# Patient Record
Sex: FEMALE | Race: WHITE | Employment: UNEMPLOYED | RURAL
[De-identification: names, ages, dates, MRNs, and addresses within clinical notes are randomized per-mention and may not be internally consistent; named-entity substitution may affect disease eponyms.]

---

## 2021-03-23 ENCOUNTER — HOSPITAL ENCOUNTER (OUTPATIENT)
Dept: LAB | Age: 56
Discharge: HOME | End: 2021-03-23

## 2021-03-23 LAB
ALBUMIN SERPL-MCNC: 2.9 G/DL (ref 3.5–5)
ALBUMIN/GLOB SERPL: 0.6 {RATIO} (ref 1.1–2.2)
ALP SERPL-CCNC: 219 U/L (ref 45–117)
ALT SERPL-CCNC: 28 U/L (ref 12–78)
ANION GAP SERPL CALC-SCNC: 6 MMOL/L (ref 5–15)
APTT PPP: 26.9 SEC (ref 22.1–31)
AST SERPL-CCNC: 99 U/L (ref 15–37)
BASOPHILS # BLD: 0.1 K/UL (ref 0–0.1)
BASOPHILS NFR BLD: 1 % (ref 0–1)
BILIRUB SERPL-MCNC: 1.1 MG/DL (ref 0.2–1)
BUN SERPL-MCNC: 4 MG/DL (ref 6–20)
BUN/CREAT SERPL: 7 (ref 12–20)
CALCIUM SERPL-MCNC: 8.2 MG/DL (ref 8.5–10.1)
CHLORIDE SERPL-SCNC: 103 MMOL/L (ref 97–108)
CO2 SERPL-SCNC: 32 MMOL/L (ref 21–32)
CREAT SERPL-MCNC: 0.58 MG/DL (ref 0.55–1.02)
DIFFERENTIAL METHOD BLD: ABNORMAL
EOSINOPHIL # BLD: 0.1 K/UL (ref 0–0.4)
EOSINOPHIL NFR BLD: 1 % (ref 0–7)
ERYTHROCYTE [DISTWIDTH] IN BLOOD BY AUTOMATED COUNT: 14.2 % (ref 11.5–14.5)
GLOBULIN SER CALC-MCNC: 4.9 G/DL (ref 2–4)
GLUCOSE SERPL-MCNC: 93 MG/DL (ref 65–100)
HCT VFR BLD AUTO: 36.3 % (ref 35–47)
HGB BLD-MCNC: 11.9 G/DL (ref 11.5–16)
IMM GRANULOCYTES # BLD AUTO: 0 K/UL (ref 0–0.04)
IMM GRANULOCYTES NFR BLD AUTO: 0 % (ref 0–0.5)
INR PPP: 1.3 (ref 0.9–1.1)
LYMPHOCYTES # BLD: 1.3 K/UL (ref 0.8–3.5)
LYMPHOCYTES NFR BLD: 17 % (ref 12–49)
MCH RBC QN AUTO: 37.9 PG (ref 26–34)
MCHC RBC AUTO-ENTMCNC: 32.8 G/DL (ref 30–36.5)
MCV RBC AUTO: 115.6 FL (ref 80–99)
MONOCYTES # BLD: 0.5 K/UL (ref 0–1)
MONOCYTES NFR BLD: 7 % (ref 5–13)
NEUTS SEG # BLD: 5.4 K/UL (ref 1.8–8)
NEUTS SEG NFR BLD: 74 % (ref 32–75)
NRBC # BLD: 0 K/UL (ref 0–0.01)
NRBC BLD-RTO: 0 PER 100 WBC
PLATELET # BLD AUTO: 125 K/UL (ref 150–400)
PMV BLD AUTO: 10.1 FL (ref 8.9–12.9)
POTASSIUM SERPL-SCNC: 3.8 MMOL/L (ref 3.5–5.1)
PROT SERPL-MCNC: 7.8 G/DL (ref 6.4–8.2)
PROTHROMBIN TIME: 12.7 SEC (ref 9–11.1)
RBC # BLD AUTO: 3.14 M/UL (ref 3.8–5.2)
RBC MORPH BLD: ABNORMAL
RBC MORPH BLD: ABNORMAL
SODIUM SERPL-SCNC: 141 MMOL/L (ref 136–145)
THERAPEUTIC RANGE,PTTT: NORMAL SECS (ref 58–77)
WBC # BLD AUTO: 7.4 K/UL (ref 3.6–11)

## 2021-03-23 PROCEDURE — 85025 COMPLETE CBC W/AUTO DIFF WBC: CPT

## 2021-03-23 PROCEDURE — 85730 THROMBOPLASTIN TIME PARTIAL: CPT

## 2021-03-23 PROCEDURE — 80053 COMPREHEN METABOLIC PANEL: CPT

## 2021-03-23 PROCEDURE — 85610 PROTHROMBIN TIME: CPT

## 2021-04-22 ENCOUNTER — HOSPITAL ENCOUNTER (OUTPATIENT)
Dept: LAB | Age: 56
Discharge: HOME | End: 2021-04-22

## 2021-04-22 LAB
ALBUMIN SERPL-MCNC: 3 G/DL (ref 3.5–5)
ALBUMIN/GLOB SERPL: 0.6 {RATIO} (ref 1.1–2.2)
ALP SERPL-CCNC: 224 U/L (ref 45–117)
ALT SERPL-CCNC: 38 U/L (ref 12–78)
ANION GAP SERPL CALC-SCNC: 10 MMOL/L (ref 5–15)
AST SERPL-CCNC: 162 U/L (ref 15–37)
BASOPHILS # BLD: 0.1 K/UL (ref 0–0.1)
BASOPHILS NFR BLD: 1 % (ref 0–1)
BILIRUB SERPL-MCNC: 0.9 MG/DL (ref 0.2–1)
BUN SERPL-MCNC: 4 MG/DL (ref 6–20)
BUN/CREAT SERPL: 7 (ref 12–20)
CALCIUM SERPL-MCNC: 8.1 MG/DL (ref 8.5–10.1)
CHLORIDE SERPL-SCNC: 100 MMOL/L (ref 97–108)
CO2 SERPL-SCNC: 30 MMOL/L (ref 21–32)
CREAT SERPL-MCNC: 0.56 MG/DL (ref 0.55–1.02)
DIFFERENTIAL METHOD BLD: ABNORMAL
EOSINOPHIL # BLD: 0 K/UL (ref 0–0.4)
EOSINOPHIL NFR BLD: 1 % (ref 0–7)
ERYTHROCYTE [DISTWIDTH] IN BLOOD BY AUTOMATED COUNT: 13.6 % (ref 11.5–14.5)
GLOBULIN SER CALC-MCNC: 5.3 G/DL (ref 2–4)
GLUCOSE SERPL-MCNC: 94 MG/DL (ref 65–100)
HCT VFR BLD AUTO: 36.2 % (ref 35–47)
HGB BLD-MCNC: 12.3 G/DL (ref 11.5–16)
IMM GRANULOCYTES # BLD AUTO: 0 K/UL (ref 0–0.04)
IMM GRANULOCYTES NFR BLD AUTO: 0 % (ref 0–0.5)
INR PPP: 1.3 (ref 0.9–1.1)
LYMPHOCYTES # BLD: 1.9 K/UL (ref 0.8–3.5)
LYMPHOCYTES NFR BLD: 24 % (ref 12–49)
MCH RBC QN AUTO: 34.9 PG (ref 26–34)
MCHC RBC AUTO-ENTMCNC: 34 G/DL (ref 30–36.5)
MCV RBC AUTO: 102.8 FL (ref 80–99)
MONOCYTES # BLD: 0.6 K/UL (ref 0–1)
MONOCYTES NFR BLD: 7 % (ref 5–13)
NEUTS SEG # BLD: 5.1 K/UL (ref 1.8–8)
NEUTS SEG NFR BLD: 67 % (ref 32–75)
NRBC # BLD: 0 K/UL (ref 0–0.01)
NRBC BLD-RTO: 0 PER 100 WBC
PLATELET # BLD AUTO: 86 K/UL (ref 150–400)
PMV BLD AUTO: 10.6 FL (ref 8.9–12.9)
POTASSIUM SERPL-SCNC: 3.7 MMOL/L (ref 3.5–5.1)
PROT SERPL-MCNC: 8.3 G/DL (ref 6.4–8.2)
PROTHROMBIN TIME: 13 SEC (ref 9–11.1)
RBC # BLD AUTO: 3.52 M/UL (ref 3.8–5.2)
SODIUM SERPL-SCNC: 140 MMOL/L (ref 136–145)
WBC # BLD AUTO: 7.7 K/UL (ref 3.6–11)

## 2021-04-22 PROCEDURE — 85025 COMPLETE CBC W/AUTO DIFF WBC: CPT

## 2021-04-22 PROCEDURE — 80053 COMPREHEN METABOLIC PANEL: CPT

## 2021-04-22 PROCEDURE — 85610 PROTHROMBIN TIME: CPT

## 2021-04-28 ENCOUNTER — OFFICE VISIT (OUTPATIENT)
Dept: SURGERY | Age: 56
End: 2021-04-28
Payer: MEDICAID

## 2021-04-28 VITALS
TEMPERATURE: 97.3 F | SYSTOLIC BLOOD PRESSURE: 133 MMHG | RESPIRATION RATE: 16 BRPM | WEIGHT: 101 LBS | BODY MASS INDEX: 19.83 KG/M2 | OXYGEN SATURATION: 97 % | DIASTOLIC BLOOD PRESSURE: 84 MMHG | HEART RATE: 98 BPM | HEIGHT: 60 IN

## 2021-04-28 DIAGNOSIS — K70.31 ALCOHOLIC CIRRHOSIS OF LIVER WITH ASCITES (HCC): ICD-10-CM

## 2021-04-28 DIAGNOSIS — F10.10 ETOH ABUSE: ICD-10-CM

## 2021-04-28 DIAGNOSIS — K43.9 VENTRAL HERNIA WITHOUT OBSTRUCTION OR GANGRENE: Primary | ICD-10-CM

## 2021-04-28 PROCEDURE — 99205 OFFICE O/P NEW HI 60 MIN: CPT | Performed by: SURGERY

## 2021-04-28 RX ORDER — FLUOXETINE HYDROCHLORIDE 20 MG/1
20 CAPSULE ORAL DAILY
COMMUNITY
End: 2021-10-28

## 2021-04-28 RX ORDER — METOPROLOL TARTRATE 25 MG/1
25 TABLET, FILM COATED ORAL 2 TIMES DAILY
Status: ON HOLD | COMMUNITY
End: 2021-06-07

## 2021-04-28 RX ORDER — SPIRONOLACTONE 25 MG/1
25 TABLET ORAL AS NEEDED
COMMUNITY
End: 2021-09-30 | Stop reason: SINTOL

## 2021-04-28 NOTE — PROGRESS NOTES
HISTORY OF PRESENT ILLNESS  Lili Garcia is a 60 y.o. female who comes in for consultation by Santiago Kidd NP for a hernia  HPI  She has had a hernia near the umbilicus for several years. Recently the area has become tender and bothersome. She denies associated nausea, vomiting, diarrhea, constipation, melena, hematochezia, dysuria or hematuria. She also was recently dx with cirrhosis and US suggested cirrhotic liver morphology and a small amount of ascites. She drinks at least 6 beers daily and has done so for many years. Past Medical History:   Diagnosis Date    Asthma     Weight loss      Past Surgical History:   Procedure Laterality Date    HX OTHER SURGICAL  1988    bunon removal    IN BREAST SURGERY PROCEDURE UNLISTED  1983    breast reduction      History reviewed. No pertinent family history. Social History     Tobacco Use    Smoking status: Never Smoker    Smokeless tobacco: Current User     Types: Chew   Substance Use Topics    Alcohol use: Yes     Comment: socially    Drug use: Never     Current Outpatient Medications   Medication Sig    FLUoxetine (PROzac) 20 mg capsule Take 20 mg by mouth daily.  b complex-vitamin c-folic acid (NEPHROCAPS) 1 mg capsule Take 1 Cap by mouth daily.  metoprolol tartrate (LOPRESSOR) 25 mg tablet Take 25 mg by mouth two (2) times a day.  spironolactone (ALDACTONE) 25 mg tablet Take 25 mg by mouth daily.  COVID-19 VACC,MRNA,MODERNA,-PF IM by IntraMUSCular route. Indications: First dose 3/7/21 Second dose 4/4/21     No current facility-administered medications for this visit. No Known Allergies    Review of Systems   Constitutional: Positive for weight loss. Negative for chills, diaphoresis and fever. HENT: Negative for sore throat. Eyes: Negative for blurred vision and discharge. Respiratory: Positive for shortness of breath and wheezing. Negative for cough.     Cardiovascular: Negative for chest pain, palpitations, orthopnea, claudication and leg swelling. Gastrointestinal: Negative for abdominal pain, constipation, diarrhea, heartburn, melena, nausea and vomiting. Genitourinary: Negative for dysuria, flank pain, frequency and hematuria. Musculoskeletal: Negative for back pain, joint pain, myalgias and neck pain. Skin: Negative for rash. Neurological: Negative for dizziness, speech change, focal weakness, seizures, loss of consciousness, weakness and headaches. Hx CVA   Endo/Heme/Allergies: Bruises/bleeds easily. Psychiatric/Behavioral: Negative for depression and memory loss. Visit Vitals  /84 (BP 1 Location: Left upper arm, BP Patient Position: Sitting, BP Cuff Size: Adult)   Pulse 98   Temp 97.3 °F (36.3 °C) (Temporal)   Resp 16   Ht 5' (1.524 m)   Wt 45.8 kg (101 lb)   SpO2 97%   BMI 19.73 kg/m²       Physical Exam  Constitutional:       General: She is not in acute distress. Appearance: She is well-developed. She is not diaphoretic. HENT:      Head: Normocephalic and atraumatic. Mouth/Throat:      Pharynx: No oropharyngeal exudate. Eyes:      General: No scleral icterus. Conjunctiva/sclera: Conjunctivae normal.      Pupils: Pupils are equal, round, and reactive to light. Neck:      Musculoskeletal: Normal range of motion and neck supple. Thyroid: No thyromegaly. Vascular: No JVD. Trachea: No tracheal deviation. Cardiovascular:      Rate and Rhythm: Normal rate and regular rhythm. Heart sounds: No murmur. No friction rub. No gallop. Pulmonary:      Effort: Pulmonary effort is normal. No respiratory distress. Breath sounds: Normal breath sounds. No wheezing or rales. Abdominal:      General: Bowel sounds are normal. There is no distension. Palpations: Abdomen is soft. There is hepatomegaly (6 cm below costal margin). There is no mass. Tenderness: There is abdominal tenderness in the periumbilical area. There is no guarding or rebound. Hernia: A hernia is present. Hernia is present in the ventral area (tender but reducible hernia near umbilicus). Musculoskeletal: Normal range of motion. Lymphadenopathy:      Cervical: No cervical adenopathy. Skin:     General: Skin is warm and dry. Coloration: Skin is not pale. Findings: No erythema or rash. Comments: Multiple sites of ecchymosis on extremities   Neurological:      Mental Status: She is alert and oriented to person, place, and time. Cranial Nerves: No cranial nerve deficit. Psychiatric:         Behavior: Behavior normal.         Thought Content: Thought content normal.         Judgment: Judgment normal.         ASSESSMENT and PLAN  1. Ventral hernia near umbilicus. Symptomatic but reducible. I explained about the anatomy and pathophysiology of hernias and the risk of incarceration and strangulation of the bowel. I explained about hernia repairs (open with and without mesh, and robotic assisted and laparoscopic with mesh). I explained the risks and benefits of repair including bleeding, infection, chronic pain, bowel or bladder injury, hernia recurrence, seroma, mesh infection requiring removal.  I explained it would be a six to eight week recuperation with no driving for 5 - 7 days, no lifting for six weeks. 2.   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 She has appointment with Dr Aaron Curtis in early May for assessment  While I do not have all her labs, those written in the notes suggest a Margie-Suarez class C cirrhotic  3. ETOH abuse  I recommended cessation. She has not gone through withdrawal but is at high risk and I cautioned her  4. Anemia Hgb 9.4. Likely some GI losses and nutrition related  5. Thrombocytopenia. Platelets 631 K    At this point she needs further work up with Dr Aaron Curtis as planned.   She is at high risk for elective surgical intervention    She will return after work up by Dr Jonathan Caro MD FACS

## 2021-04-28 NOTE — PROGRESS NOTES
Identified pt with two pt identifiers(name and ). Reviewed record in preparation for visit and have obtained necessary documentation. All patient medications has been reviewed. Chief Complaint   Patient presents with    Possible Hernia     Seen at the request of Nabila Javier N.P for eval of umb hernia       Health Maintenance Due   Topic    COVID-19 Vaccine (1)    DTaP/Tdap/Td series (1 - Tdap)    PAP AKA CERVICAL CYTOLOGY     Shingrix Vaccine Age 49> (1 of 2)    Colorectal Cancer Screening Combo     Breast Cancer Screen Mammogram        Vitals:    21 1519   BP: 133/84   Pulse: 98   Resp: 16   Temp: 97.3 °F (36.3 °C)   TempSrc: Temporal   SpO2: 97%   Weight: 45.8 kg (101 lb)   Height: 5' (1.524 m)   PainSc:   8   PainLoc: Abdomen       4. Have you been to the ER, urgent care clinic since your last visit? Hospitalized since your last visit? No    5. Have you seen or consulted any other health care providers outside of the [address] [place] [address] since your last visit? Include any pap smears or colon screening. No      Patient is accompanied by self I have received verbal consent from Gila Trejo to discuss any/all medical information while they are present in the room.

## 2021-05-05 ENCOUNTER — HOSPITAL ENCOUNTER (OUTPATIENT)
Dept: LAB | Age: 56
Discharge: HOME | End: 2021-05-05

## 2021-05-05 ENCOUNTER — OFFICE VISIT (OUTPATIENT)
Dept: HEMATOLOGY | Age: 56
End: 2021-05-05
Payer: MEDICAID

## 2021-05-05 VITALS
BODY MASS INDEX: 18.95 KG/M2 | DIASTOLIC BLOOD PRESSURE: 72 MMHG | HEIGHT: 61 IN | WEIGHT: 100.38 LBS | SYSTOLIC BLOOD PRESSURE: 122 MMHG | TEMPERATURE: 97 F | HEART RATE: 73 BPM | OXYGEN SATURATION: 98 %

## 2021-05-05 DIAGNOSIS — K70.31 ALCOHOLIC CIRRHOSIS OF LIVER WITH ASCITES (HCC): Primary | ICD-10-CM

## 2021-05-05 LAB — XX-LABCORP SPECIMEN COL,LCBCF: NORMAL

## 2021-05-05 PROCEDURE — 99204 OFFICE O/P NEW MOD 45 MIN: CPT | Performed by: INTERNAL MEDICINE

## 2021-05-05 PROCEDURE — 99001 SPECIMEN HANDLING PT-LAB: CPT

## 2021-05-05 NOTE — PROGRESS NOTES
Lorie Pour [address]      Татьяна Peterson MD, Christel Euceda, Barber Breen MD, MPH      Roberto Alonso, PA-C Vito Landau, [facility]-BC     Bhavana Delcid, [facility]   Cesar Angela, ELIEL-TITA Marcano, [facility]       Chicho [facility] 136    at [address], [address], [facility] 22.    [phone]    FAX: [address] [place] [address], [address]    [phone]    FAX: [phone]       Patient Care Team:  Dusty Martinez NP as PCP - General (Nurse Practitioner)      Problem List  Date Reviewed: 5/5/2021          Codes Class Noted    Ventral hernia without obstruction or gangrene ICD-10-CM: K43.9  ICD-9-CM: 553.20  0/82/1080        Alcoholic cirrhosis of liver with ascites [facility]) ICD-10-CM: K70.31  ICD-9-CM: 571.2  4/28/2021        ETOH abuse ICD-10-CM: F10.10  ICD-9-CM: 305.00  4/28/2021                The clinicians listed above have asked me to see Carson Mcnair in consultation regarding management of cirrhosis secondary to alcohol. All medical records sent by the referring physicians were reviewed including imaging studies     The patient is a 60 y.o.  female who was found to have chronic liver disease and cirrhosis in 3/2021 when she underwent a liver ultrasound. She says she had a LBX but does not remember when or where. I cannot find a record of a liver biopsy in the [facility] or [facility]. Serologic evaluation for markers of chronic liver disease has either not been performed or the results are not available. The patient has developed the following complications of cirrhosis: ascites,     The patient does not have any symptoms which could be attributed to the liver disorder.     The patient is not experiencing the following symptoms which are commonly seen in this liver disorder:   fatigue,   pain in the right side over the liver,     The patient completes all daily activities without any functional limitations. ASSESSMENT AND PLAN:  Cirrhosis  The diagnosis of cirrhosis is based upon imaging, laboratory studies, complications of cirrhosis. Cirrhosis is secondary to alcohol. The patient has near normal liver function. The CTP is 6. Child class A. The MELD score is 11. Have performed laboratory testing to monitor liver function and degree of liver injury. This included BMP, hepatic panel, CBC with platelet count, INR. AST is elevated. ALT is normal.  ALP is elevated. Liver function is normal.  Total bilirubin is   elevated. The platelet count is depressed. Alcohol liver disease  Suspect the patient has alcohol induced liver disease based upon a history of consuming significant alcohol on a daily basis for many years, pattern of AST>ALT, serology that is negative for other causes of chronic liver disease. The patient has consumed 9-98 alcoholic beverages daily. The patient has been abstinent from alcohol since 4/2021. Discussed the need to remain alcohol free long term. Ascites   Ascites developed for the first time in 3/2021. Ascites has resolved with current dose of diuretics and abstinence  Will continue the current dose of diuretics with aldactone 35 mg every day. The patient was counseled regarding the need to maintain sodium restriction and the types of foods containing high amounts of sodium to be avoided. Lower extremity edema  Edema has resolved with current dose of diuretics. The renal function is normal and edema should be able to be controled with diuretics. Screening for Esophageal varices   The patient has not had an EGD to screen for varices. Will schedule for EGD to assess for varices     Hepatic encephalopathy   Overt HE has not developed to date. There is no reason for treatment with lactulose or xifaxan. There is no need to restrict dietary protein at this time. Thrombocytopenia   This is secondary to cirrhosis. There is no evidence of overt bleeding. No treatment is required. The platelet count is adequate for the patient to undergo procedures without the need for platelet transfusion or platelet growth factors. Screening for Hepatocellular Carcinoma  HCC screening has recently been performed and does not suggest [address] The next liver imaging study will be performed in 9/2021. AFP was ordered today and was normal    Treatment of other medical problems in patients with chronic liver disease  There are no contraindications for the patient to take most medications that are necessary for treatment of other medical issues. Counseling for alcohol in patients with chronic liver disease  The patient was counseled regarding alcohol consumption and the effect of alcohol on chronic liver disease. The patient has not consumed alcohol since 4/2021    Osteoporosis  The risk of osteoporosis is increased in patients with cirrhosis. DEXA bone density to assess for osteoporosis has not been performed. This should be ordered by the patients primary care physician. Vaccinations   Vaccination for viral hepatitis A and B is not needed. The patient has serologic evidence of prior exposure or vaccination with immunity. Routine vaccinations against other bacterial and viral agents can be performed as indicated. Annual flu vaccination should be administered if indicated. ALLERGIES  No Known Allergies    MEDICATIONS  Current Outpatient Medications   Medication Sig    FLUoxetine (PROzac) 20 mg capsule Take 20 mg by mouth daily.  b complex-vitamin c-folic acid (NEPHROCAPS) 1 mg capsule Take 1 Cap by mouth daily.  spironolactone (ALDACTONE) 25 mg tablet Take 25 mg by mouth daily.     metoprolol tartrate (LOPRESSOR) 25 mg tablet Take 25 mg by mouth two (2) times a day.  COVID-19 VACC,MRNA,MODERNA,-PF IM by IntraMUSCular route. Indications: First dose 3/7/21 Second dose 21     No current facility-administered medications for this visit. SYSTEM REVIEW NOT RELATED TO LIVER DISEASE OR REVIEWED ABOVE:  Constitution systems: Negative for fever, chills, weight gain, weight loss. Eyes: Negative for visual changes. ENT: Negative for sore throat, painful swallowing. Respiratory: Negative for cough, hemoptysis, SOB. Cardiology: Negative for chest pain, palpitations. GI:  Negative for constipation or diarrhea. : Negative for urinary frequency, dysuria, hematuria, nocturia. Skin: Negative for rash. Hematology: Negative for easy bruising, blood clots. Musculo-skelatal: Negative for back pain, muscle pain, weakness. Neurologic: Negative for headaches, dizziness, vertigo, memory problems not related to HE. Psychology: Negative for anxiety, depression. FAMILY HISTORY:  The father  of MI. The mother  of DM. There is no family history of liver disease. SOCIAL HISTORY:  The patient is . The patient has 1 child. The patient has never used tobacco products. The patient consumes 6-75 alcoholic beverages per day   The patient has been abstinent from alcohol since 2020. The patient used to work as a . The patient has not worked since 2021. PHYSICAL EXAMINATION:  Visit Vitals  /72   Pulse 73   Temp 97 °F (36.1 °C) (Tympanic)   Ht 5' 1\" (1.549 m)   Wt 100 lb 6 oz (45.5 kg)   SpO2 98%   BMI 18.97 kg/m²     General: No acute distress. Eyes: Sclera anicteric. ENT: No oral lesions. Thyroid normal.  Nodes: No adenopathy. Skin: No spider angiomata. No jaundice. No palmar erythema. Respiratory: Lungs clear to auscultation. Cardiovascular: Regular heart rate. No murmurs. No JVD. Abdomen: Soft non-tender. Liver size normal to percussion/palpation.   Spleen not palpable. No obvious ascites. Umbilical hernia  Extremities: No edema. No muscle wasting. No gross arthritic changes. Neurologic: Alert and oriented. Cranial nerves grossly intact. No asterixis. LABORATORY STUDIES:  Liver [place] of [address] & Units 5/5/2021 4/22/2021   WBC 3.4 - 10.8 x10E3/uL 5.6 7.7   ANC 1.4 - 7.0 x10E3/uL 4.1 5.1   HGB 11.1 - 15.9 g/dL 11.9 12.3    - 450 x10E3/uL 112 (L) 86 (L)   INR 0.9 - 1.2 1.2 1.3 (H)   AST 0 - 40 IU/L 99 (H) 162 (H)   ALT 0 - 32 IU/L 25 38   Alk Phos 39 - 117 IU/L 195 (H) 224 (H)   Bili, Total 0.0 - 1.2 mg/dL 2.1 (H) 0.9   Bili, Direct 0.00 - 0.40 mg/dL 1.05 (H)    Albumin 3.8 - 4.9 g/dL 4.1 3.0 (L)   BUN 6 - 24 mg/dL 5 (L) 4 (L)   Creat 0.57 - 1.00 mg/dL 0.57 0.56   Na 134 - 144 mmol/L 133 (L) 140   K 3.5 - 5.2 mmol/L 3.6 3.7   Cl 96 - 106 mmol/L 98 100   CO2 20 - 29 mmol/L 22 30   Glucose 65 - 99 mg/dL 93 94     Cancer Screening Latest Ref Rng & Units 5/5/2021   AFP, Serum 0.0 - 8.0 ng/mL 12.1 (H)   AFP-L3% 0.0 - 9.9 % Comment     SEROLOGIES:  Serologies Latest Ref Rng & Units 5/5/2021   Hep A Ab, Total Negative Positive (A)   Hep B Surface Ag Negative Negative   Hep B Surface Ag Index    Hep B Surface Ag Interp NEG      Hep B Core Ab, Total Negative Negative   Hep B Surface AB QL  Reactive   Hep C Ab 0.0 - 0.9 s/co ratio <0.1   Hep C Ab NR      Ferritin 15 - 150 ng/mL 128   Iron % Saturation 15 - 55 % 31   MULUGETA, IFA  Negative   ASMCA 0 - 19 Units 9   Ceruloplasmin 19.0 - 39.0 mg/dL 25.5   Alpha-1 antitrypsin level 101 - 187 mg/dL 198 (H)     LIVER HISTOLOGY:  Not available or performed    ENDOSCOPIC PROCEDURES:  Not available or performed    RADIOLOGY:  3/2021. Ultrasound of liver. Echogenic consistent with cirrhosis. No liver mass lesions. No dilated bile ducts. Mild ascites. OTHER TESTING:  Not available or performed    FOLLOW-UP:  All of the issues listed above in the Assessment and Plan were discussed with the patient.   All questions were answered. The patient expressed a clear understanding of the above. [address] [address] in 4 weeks to review all data and determine the treatment plan.       Topher Dove MD  [address] [address]  4 [facility], [address] [place] [address], [address] [place] [address] Street [address]  [address] [place] [address]

## 2021-05-05 NOTE — Clinical Note
5/14/2021 Patient: Madeline Kat YOB: 1965 Date of Visit: 5/5/2021 David Hernandez NP 
[address] [place] [id number] [address] Fax: [phone] Dear David Hernandez NP, Thank you for referring Ms. Alley Morel to [address] for evaluation. My notes for this consultation are attached. If you have questions, please do not hesitate to call me. I look forward to following your patient along with you. Sincerely, Lynda George MD

## 2021-05-07 LAB
A1AT SERPL-MCNC: 198 MG/DL (ref 101–187)
ACTIN IGG SERPL-ACNC: 9 UNITS (ref 0–19)
AFP L3 MFR SERPL: ABNORMAL % (ref 0–9.9)
AFP SERPL-MCNC: 12.1 NG/ML (ref 0–8)
ALBUMIN SERPL-MCNC: 4.1 G/DL (ref 3.8–4.9)
ALP SERPL-CCNC: 195 IU/L (ref 39–117)
ALT SERPL-CCNC: 25 IU/L (ref 0–32)
ANA TITR SER IF: NEGATIVE {TITER}
AST SERPL-CCNC: 99 IU/L (ref 0–40)
BASOPHILS # BLD AUTO: 0.1 X10E3/UL (ref 0–0.2)
BASOPHILS NFR BLD AUTO: 1 %
BILIRUB DIRECT SERPL-MCNC: 1.05 MG/DL (ref 0–0.4)
BILIRUB SERPL-MCNC: 2.1 MG/DL (ref 0–1.2)
BUN SERPL-MCNC: 5 MG/DL (ref 6–24)
BUN/CREAT SERPL: 9 (ref 9–23)
CALCIUM SERPL-MCNC: 9.2 MG/DL (ref 8.7–10.2)
CERULOPLASMIN SERPL-MCNC: 25.5 MG/DL (ref 19–39)
CHLORIDE SERPL-SCNC: 98 MMOL/L (ref 96–106)
CO2 SERPL-SCNC: 22 MMOL/L (ref 20–29)
CREAT SERPL-MCNC: 0.57 MG/DL (ref 0.57–1)
EOSINOPHIL # BLD AUTO: 0.1 X10E3/UL (ref 0–0.4)
EOSINOPHIL NFR BLD AUTO: 1 %
ERYTHROCYTE [DISTWIDTH] IN BLOOD BY AUTOMATED COUNT: 13.3 % (ref 11.7–15.4)
FERRITIN SERPL-MCNC: 128 NG/ML (ref 15–150)
GLUCOSE SERPL-MCNC: 93 MG/DL (ref 65–99)
HAV AB SER QL IA: POSITIVE
HBV CORE AB SERPL QL IA: NEGATIVE
HBV SURFACE AB SER QL: REACTIVE
HBV SURFACE AG SERPL QL IA: NEGATIVE
HCT VFR BLD AUTO: 33.8 % (ref 34–46.6)
HCV AB S/CO SERPL IA: <0.1 S/CO RATIO (ref 0–0.9)
HCV AB SERPL QL IA: NORMAL
HGB BLD-MCNC: 11.9 G/DL (ref 11.1–15.9)
IMM GRANULOCYTES # BLD AUTO: 0 X10E3/UL (ref 0–0.1)
IMM GRANULOCYTES NFR BLD AUTO: 0 %
INR PPP: 1.2 (ref 0.9–1.2)
IRON SATN MFR SERPL: 31 % (ref 15–55)
IRON SERPL-MCNC: 118 UG/DL (ref 27–159)
LYMPHOCYTES # BLD AUTO: 0.8 X10E3/UL (ref 0.7–3.1)
LYMPHOCYTES NFR BLD AUTO: 14 %
MCH RBC QN AUTO: 35.8 PG (ref 26.6–33)
MCHC RBC AUTO-ENTMCNC: 35.2 G/DL (ref 31.5–35.7)
MCV RBC AUTO: 102 FL (ref 79–97)
MONOCYTES # BLD AUTO: 0.6 X10E3/UL (ref 0.1–0.9)
MONOCYTES NFR BLD AUTO: 11 %
NEUTROPHILS # BLD AUTO: 4.1 X10E3/UL (ref 1.4–7)
NEUTROPHILS NFR BLD AUTO: 73 %
PLATELET # BLD AUTO: 112 X10E3/UL (ref 150–450)
POTASSIUM SERPL-SCNC: 3.6 MMOL/L (ref 3.5–5.2)
PROT SERPL-MCNC: 8.4 G/DL (ref 6–8.5)
PROTHROMBIN TIME: 13 SEC (ref 9.1–12)
RBC # BLD AUTO: 3.32 X10E6/UL (ref 3.77–5.28)
SODIUM SERPL-SCNC: 133 MMOL/L (ref 134–144)
TIBC SERPL-MCNC: 376 UG/DL (ref 250–450)
UIBC SERPL-MCNC: 258 UG/DL (ref 131–425)
WBC # BLD AUTO: 5.6 X10E3/UL (ref 3.4–10.8)

## 2021-05-14 PROBLEM — R18.8 ASCITES: Status: ACTIVE | Noted: 2021-05-14

## 2021-05-14 PROBLEM — K70.9 ALCOHOLIC LIVER DISEASE (HCC): Status: ACTIVE | Noted: 2021-05-14

## 2021-05-14 PROBLEM — F10.11 ALCOHOL ABUSE, IN REMISSION: Status: ACTIVE | Noted: 2021-04-28

## 2021-05-14 PROBLEM — K74.60 CIRRHOSIS (HCC): Status: ACTIVE | Noted: 2021-04-28

## 2021-06-01 ENCOUNTER — DOCUMENTATION ONLY (OUTPATIENT)
Dept: HEMATOLOGY | Age: 56
End: 2021-06-01

## 2021-06-01 NOTE — PROGRESS NOTES
Spoke to patient and reminded her to get COVID testing done. Also reminded of arrival time for procedure.

## 2021-06-02 DIAGNOSIS — Z01.812 PRE-PROCEDURE LAB EXAM: Primary | ICD-10-CM

## 2021-06-05 ENCOUNTER — ANESTHESIA EVENT (OUTPATIENT)
Dept: ENDOSCOPY | Age: 56
End: 2021-06-05
Payer: MEDICAID

## 2021-06-06 RX ORDER — ONDANSETRON 2 MG/ML
4 INJECTION INTRAMUSCULAR; INTRAVENOUS ONCE
Status: CANCELLED | OUTPATIENT
Start: 2021-06-06 | End: 2021-06-06

## 2021-06-06 RX ORDER — SODIUM CHLORIDE 0.9 % (FLUSH) 0.9 %
5-40 SYRINGE (ML) INJECTION EVERY 8 HOURS
Status: CANCELLED | OUTPATIENT
Start: 2021-06-06

## 2021-06-06 RX ORDER — FENTANYL CITRATE 50 UG/ML
25 INJECTION, SOLUTION INTRAMUSCULAR; INTRAVENOUS AS NEEDED
Status: CANCELLED | OUTPATIENT
Start: 2021-06-06

## 2021-06-06 RX ORDER — OXYCODONE AND ACETAMINOPHEN 5; 325 MG/1; MG/1
1 TABLET ORAL AS NEEDED
Status: CANCELLED | OUTPATIENT
Start: 2021-06-06

## 2021-06-06 RX ORDER — MAGNESIUM SULFATE 100 %
4 CRYSTALS MISCELLANEOUS AS NEEDED
Status: CANCELLED | OUTPATIENT
Start: 2021-06-06

## 2021-06-06 RX ORDER — SODIUM CHLORIDE, SODIUM LACTATE, POTASSIUM CHLORIDE, CALCIUM CHLORIDE 600; 310; 30; 20 MG/100ML; MG/100ML; MG/100ML; MG/100ML
100 INJECTION, SOLUTION INTRAVENOUS CONTINUOUS
Status: CANCELLED | OUTPATIENT
Start: 2021-06-06

## 2021-06-06 RX ORDER — DEXTROSE 50 % IN WATER (D50W) INTRAVENOUS SYRINGE
25-50 AS NEEDED
Status: CANCELLED | OUTPATIENT
Start: 2021-06-06

## 2021-06-06 RX ORDER — SODIUM CHLORIDE 0.9 % (FLUSH) 0.9 %
5-40 SYRINGE (ML) INJECTION AS NEEDED
Status: CANCELLED | OUTPATIENT
Start: 2021-06-06

## 2021-06-06 RX ORDER — FENTANYL CITRATE 50 UG/ML
50 INJECTION, SOLUTION INTRAMUSCULAR; INTRAVENOUS
Status: CANCELLED | OUTPATIENT
Start: 2021-06-06

## 2021-06-07 ENCOUNTER — OFFICE VISIT (OUTPATIENT)
Dept: HEMATOLOGY | Age: 56
End: 2021-06-07

## 2021-06-07 ENCOUNTER — HOSPITAL ENCOUNTER (OUTPATIENT)
Age: 56
Setting detail: OUTPATIENT SURGERY
Discharge: HOME | End: 2021-06-07
Attending: INTERNAL MEDICINE | Admitting: INTERNAL MEDICINE
Payer: MEDICAID

## 2021-06-07 ENCOUNTER — ANESTHESIA (OUTPATIENT)
Dept: ENDOSCOPY | Age: 56
End: 2021-06-07
Payer: MEDICAID

## 2021-06-07 VITALS
HEIGHT: 61 IN | HEART RATE: 86 BPM | RESPIRATION RATE: 18 BRPM | OXYGEN SATURATION: 99 % | DIASTOLIC BLOOD PRESSURE: 83 MMHG | BODY MASS INDEX: 19.92 KG/M2 | TEMPERATURE: 97.3 F | WEIGHT: 105.5 LBS | SYSTOLIC BLOOD PRESSURE: 124 MMHG

## 2021-06-07 DIAGNOSIS — K76.6 PORTAL HYPERTENSIVE GASTROPATHY (HCC): ICD-10-CM

## 2021-06-07 DIAGNOSIS — K25.9 GASTRIC ULCER WITHOUT HEMORRHAGE OR PERFORATION, UNSPECIFIED CHRONICITY: ICD-10-CM

## 2021-06-07 DIAGNOSIS — K31.89 PORTAL HYPERTENSIVE GASTROPATHY (HCC): ICD-10-CM

## 2021-06-07 PROCEDURE — 2709999900 HC NON-CHARGEABLE SUPPLY: Performed by: INTERNAL MEDICINE

## 2021-06-07 PROCEDURE — 74011250636 HC RX REV CODE- 250/636: Performed by: SPECIALIST

## 2021-06-07 PROCEDURE — 76040000019: Performed by: INTERNAL MEDICINE

## 2021-06-07 PROCEDURE — 77030021593 HC FCPS BIOP ENDOSC BSC -A: Performed by: INTERNAL MEDICINE

## 2021-06-07 PROCEDURE — 76060000031 HC ANESTHESIA FIRST 0.5 HR: Performed by: INTERNAL MEDICINE

## 2021-06-07 PROCEDURE — 88342 IMHCHEM/IMCYTCHM 1ST ANTB: CPT

## 2021-06-07 PROCEDURE — 74011250636 HC RX REV CODE- 250/636: Performed by: INTERNAL MEDICINE

## 2021-06-07 PROCEDURE — 88305 TISSUE EXAM BY PATHOLOGIST: CPT

## 2021-06-07 PROCEDURE — 74011000250 HC RX REV CODE- 250: Performed by: SPECIALIST

## 2021-06-07 PROCEDURE — 43239 EGD BIOPSY SINGLE/MULTIPLE: CPT | Performed by: INTERNAL MEDICINE

## 2021-06-07 RX ORDER — FLUMAZENIL 0.1 MG/ML
0.2 INJECTION INTRAVENOUS
Status: CANCELLED | OUTPATIENT
Start: 2021-06-07 | End: 2021-06-07

## 2021-06-07 RX ORDER — SODIUM CHLORIDE 9 MG/ML
100 INJECTION, SOLUTION INTRAVENOUS CONTINUOUS
Status: DISCONTINUED | OUTPATIENT
Start: 2021-06-07 | End: 2021-06-07 | Stop reason: HOSPADM

## 2021-06-07 RX ORDER — PROPOFOL 10 MG/ML
INJECTION, EMULSION INTRAVENOUS AS NEEDED
Status: DISCONTINUED | OUTPATIENT
Start: 2021-06-07 | End: 2021-06-07 | Stop reason: HOSPADM

## 2021-06-07 RX ORDER — LIDOCAINE HYDROCHLORIDE 20 MG/ML
INJECTION, SOLUTION EPIDURAL; INFILTRATION; INTRACAUDAL; PERINEURAL AS NEEDED
Status: DISCONTINUED | OUTPATIENT
Start: 2021-06-07 | End: 2021-06-07 | Stop reason: HOSPADM

## 2021-06-07 RX ORDER — DEXTROMETHORPHAN/PSEUDOEPHED 2.5-7.5/.8
1.2 DROPS ORAL
Status: CANCELLED | OUTPATIENT
Start: 2021-06-07

## 2021-06-07 RX ORDER — SODIUM CHLORIDE 0.9 % (FLUSH) 0.9 %
5-40 SYRINGE (ML) INJECTION EVERY 8 HOURS
Status: CANCELLED | OUTPATIENT
Start: 2021-06-07

## 2021-06-07 RX ORDER — NALOXONE HYDROCHLORIDE 0.4 MG/ML
0.4 INJECTION, SOLUTION INTRAMUSCULAR; INTRAVENOUS; SUBCUTANEOUS
Status: CANCELLED | OUTPATIENT
Start: 2021-06-07 | End: 2021-06-07

## 2021-06-07 RX ORDER — SODIUM CHLORIDE 0.9 % (FLUSH) 0.9 %
5-40 SYRINGE (ML) INJECTION AS NEEDED
Status: CANCELLED | OUTPATIENT
Start: 2021-06-07

## 2021-06-07 RX ORDER — EPINEPHRINE 0.1 MG/ML
1 INJECTION INTRACARDIAC; INTRAVENOUS
Status: CANCELLED | OUTPATIENT
Start: 2021-06-07 | End: 2021-06-08

## 2021-06-07 RX ORDER — IBUPROFEN 200 MG
200 TABLET ORAL
COMMUNITY
End: 2021-10-28

## 2021-06-07 RX ORDER — ATROPINE SULFATE 0.1 MG/ML
0.5 INJECTION INTRAVENOUS
Status: CANCELLED | OUTPATIENT
Start: 2021-06-07 | End: 2021-06-08

## 2021-06-07 RX ADMIN — PROPOFOL 50 MG: 10 INJECTION, EMULSION INTRAVENOUS at 09:46

## 2021-06-07 RX ADMIN — PROPOFOL 50 MG: 10 INJECTION, EMULSION INTRAVENOUS at 09:47

## 2021-06-07 RX ADMIN — PROPOFOL 40 MG: 10 INJECTION, EMULSION INTRAVENOUS at 09:49

## 2021-06-07 RX ADMIN — SODIUM CHLORIDE 100 ML/HR: 900 INJECTION, SOLUTION INTRAVENOUS at 09:09

## 2021-06-07 RX ADMIN — LIDOCAINE HYDROCHLORIDE 60 MG: 20 INJECTION, SOLUTION INTRAVENOUS at 09:46

## 2021-06-07 RX ADMIN — PROPOFOL 30 MG: 10 INJECTION, EMULSION INTRAVENOUS at 09:51

## 2021-06-07 RX ADMIN — PROPOFOL 50 MG: 10 INJECTION, EMULSION INTRAVENOUS at 09:55

## 2021-06-07 NOTE — ANESTHESIA POSTPROCEDURE EVALUATION
Procedure(s):  EGD WITH MAC; BIOPSY. MAC    Anesthesia Post Evaluation        Comments: Post-Anesthesia Evaluation and Assessment    Cardiovascular Function/Vital Signs  /83   Pulse 86   Temp 36.3 °C (97.3 °F)   Resp 18   Ht 5' 1\" (1.549 m)   Wt 47.9 kg (105 lb 8 oz)   SpO2 99%   Breastfeeding No   BMI 19.93 kg/m²     Patient is status post Procedure(s):  EGD WITH MAC; BIOPSY. Nausea/Vomiting: Controlled. Postoperative hydration reviewed and adequate. Pain:  Pain Scale 1: Numeric (0 - 10) (06/07/21 1015)  Pain Intensity 1: 0 (06/07/21 1015)   Managed. Neurological Status: At baseline. Mental Status and Level of Consciousness: Arousable. Pulmonary Status:   O2 Device: None (Room air) (06/07/21 1015)   Adequate oxygenation and airway patent. Complications related to anesthesia: None    Post-anesthesia assessment completed. No concerns. Patient has met all discharge requirements.     Signed By: Bonita Hair MD    June 7, 2021                     INITIAL Post-op Vital signs:   Vitals Value Taken Time   /83 06/07/21 1015   Temp 36.3 °C (97.3 °F) 06/07/21 1012   Pulse 86 06/07/21 1015   Resp 18 06/07/21 1015   SpO2 99 % 06/07/21 1015

## 2021-06-07 NOTE — ANESTHESIA PREPROCEDURE EVALUATION
Relevant Problems   No relevant active problems       Anesthetic History   No history of anesthetic complications     Pertinent negatives: No PONV       Review of Systems / Medical History  Patient summary reviewed, nursing notes reviewed and pertinent labs reviewed    Pulmonary          Smoker ( not today)  Asthma : well controlled  Pertinent negatives: No COPD     Neuro/Psych   Within defined limits           Cardiovascular  Within defined limits              Pertinent negatives: No hypertension, past MI and CAD       GI/Hepatic/Renal           Hiatal hernia and liver disease  Pertinent negatives: No GERD and renal disease   Endo/Other  Within defined limits        Pertinent negatives: No diabetes   Other Findings   Comments: etoh 2 days ago         Physical Exam    Airway  Mallampati: I  TM Distance: < 4 cm  Neck ROM: decreased range of motion   Mouth opening: Normal     Cardiovascular               Dental    Dentition: Poor dentition     Pulmonary                 Abdominal         Other Findings            Anesthetic Plan    ASA: 3  Anesthesia type: MAC          Induction: Intravenous  Anesthetic plan and risks discussed with: Patient

## 2021-06-07 NOTE — H&P
[address] [facility] MD Moy, Toney Rowe MD, MPH      Carl Osorio, YAZAN Mendoza, [facility]     Bhavana Delcid, [facility]-BC   Thai Vaz ABELINO Umana [facility]       Chicho Landry [facility] [address] [place] 136    at [address], [address] [address]  22.    [phone]    FAX: [address] [place] [address], [address]    [phone]    FAX: [phone]         PRE-PROCEDURE NOTE - EGD    H and P from last office visit reviewed. Allergies reviewed. Out-patient medicaton list reviewed. Patient Active Problem List   Diagnosis Code    Ventral hernia without obstruction or gangrene K43.9    Cirrhosis ([facility] [address] K74.60    Alcohol abuse, in remission F10.11    Ascites X79.2    Alcoholic liver disease (HCC) K70.9       No Known Allergies    No current facility-administered medications on file prior to encounter. Current Outpatient Medications on File Prior to Encounter   Medication Sig Dispense Refill    ibuprofen (AdviL) 200 mg tablet Take 200 mg by mouth.  FLUoxetine (PROzac) 20 mg capsule Take 20 mg by mouth daily.  b complex-vitamin c-folic acid (NEPHROCAPS) 1 mg capsule Take 1 Cap by mouth daily.  spironolactone (ALDACTONE) 25 mg tablet Take 25 mg by mouth as needed. For EGD to assess for esophageal and gastric varices. Plan to perform banding if indicated based upon variceal size and appearance. The risks of the procedure were discussed with the patient. These included reaction to anesthesia, pain, perforation and bleeding. All questions were answered. The patient wishes to proceed with the procedure.     The patient was counseled at length about the risks of charisse Covid-19 in the melia-operative and post-operative states including the recovery window of their procedure. The patient was made aware that charisse Covid-19 after a surgical procedure may worsen their prognosis for recovering from the virus and lend to a higher morbidity and or mortality risk. The patient was given the options of postponing their procedure. All of the risks, benefits, and alternatives were discussed. The patient does  wish to proceed with the procedure. PHYSICAL EXAMINATION:  Visit Vitals  /78   Pulse 85   Temp 97.2 °F (36.2 °C)   Resp 16   Ht 5' 1\" (1.549 m)   Wt 105 lb 8 oz (47.9 kg)   SpO2 97%   Breastfeeding No   BMI 19.93 kg/m²       General: No acute distress. Eyes: Sclera anicteric. ENT: No oral lesions. Thyroid normal.  Nodes: No adenopathy. Skin: No spider angiomata. No jaundice. No palmar erythema. Respiratory: Lungs clear to auscultation. Cardiovascular: Regular heart rate. No murmurs. No JVD. Abdomen: Soft non-tender, liver size normal to percussion/palpation. Spleen not palpable. No obvious ascites. Extremities: No edema. No muscle wasting. No gross arthritic changes. Neurologic: Alert and oriented. Cranial nerves grossly intact. No asterixis. MOST RECENT LABORATORY STUDIES:  Lab Results   Component Value Date/Time    WBC 5.6 05/05/2021 12:00 AM    HGB 11.9 05/05/2021 12:00 AM    HCT 33.8 (L) 05/05/2021 12:00 AM    PLATELET 162 (L) 77/99/7880 12:00 AM     (H) 05/05/2021 12:00 AM     Lab Results   Component Value Date/Time    INR 1.2 05/05/2021 12:00 AM    INR 1.3 (H) 04/22/2021 01:55 PM    INR 1.3 (H) 03/23/2021 09:30 AM    Prothrombin time 13.0 (H) 05/05/2021 12:00 AM    Prothrombin time 13.0 (H) 04/22/2021 01:55 PM    Prothrombin time 12.7 (H) 03/23/2021 09:30 AM       ASSESSMENT AND PLAN:  EGD to assess for esophageal and/or gastric varices.   Sedation per anesthesiology      MD Nancy Linda [address]  4 [facility], [address], [address]  [phone]  [address]

## 2021-06-07 NOTE — DISCHARGE INSTRUCTIONS
Lesly Oneal MD, Niko Fox MD, MPH      YAZAN Banks, [facility]-BC     Bhavana Delcid, [facility]   ISABELLA Scanlon, [facility]       Chicho Caruso De [address]    at [address], [facility] Luigi Salcido  22.    [phone]    FAX: [address] [place] [address]    [address], [address] [facility], [address]    [phone]    FAX: [phone]         ENDOSCOPY DISCHARGE INSTRUCTIONS      Terri Lawsonracquel  1965  Date: 6/7/2021    DISCOMFORT:  Use lozenges or warm salt water gargle for sore thoat  Apply warm compress to IV site if red. If redness or soreness persists call the office. You may experience gas and bloating. Walking and belching will help relieve this. You may experience chest discomfort or pressure especially if banding of esophageal varices was performed. DIET:  You may resume your normal diet. ACTIVITY:  Spend the remainder of the day resting. Avoid any strenuous activity. You may not operate a vehicle for 12 hours. You may not engage in an occupation involving machinery or appliances for rest of today. Avoid making any critical or financial decisions for at least 24 hour. Call the [employer] [address] if you have any of the following:  Increasing chest or abdominal pain, nausea, vomiting, vomiting blood, abdominal distension or swelling, fever or chills, bloody discharge from nose or mouth or shortness of breath. Follow-up Instructions:  Call Dr. Magali Martinez for any questions or problems at the phone number listed above. If a biopsy was performed, you will be contacted by the office staff or Dr Magali Martinez within 1 week.    If you have not heard from us by then you may call the office at the phone number listed above to inquire about the results. ENDOSCOPY FINDINGS:  Your endoscopy demonstrated 2 gastric ulcers. A biopsy of the stomach was taken. Will repeat EGD to look for development of varices in 3 years. Keep follow-up appointment with Jack Singer on 6/15/2021. DISCHARGE SUMMARY from the Nurse: The following personal items collected during your admission are returned to you:   Dental Appliance: Dental Appliances: None  Vision: Visual Aid: Glasses, With patient  Hearing Aid:    Jewelry:    Clothing:    Other Valuables:    Valuables sent to safe:                          Learning About Coronavirus (COVID-19)  Coronavirus (COVID-19): Overview  What is coronavirus ([id number]-68)? The coronavirus disease (COVID-19) is caused by a virus. It is an illness that was first found in [place], [place], in December 2019. It has since spread worldwide. The virus can cause fever, cough, and trouble breathing. In severe cases, it can cause pneumonia and make it hard to breathe without help. It can cause death. Coronaviruses are a large group of viruses. They cause the common cold. They also cause more serious illnesses like Middle East respiratory syndrome (MERS) and severe acute respiratory syndrome (SARS). COVID-19 is caused by a novel coronavirus. That means it's a new type that has not been seen in people before. This virus spreads person-to-person through droplets from coughing and sneezing. It can also spread when you are close to someone who is infected. And it can spread when you touch something that has the virus on it, such as a doorknob or a tabletop. What can you do to protect yourself from coronavirus (COVID-19)? The best way to protect yourself from getting sick is to:  · Avoid areas where there is an outbreak. · Avoid contact with people who may be infected. · Wash your hands often with soap or alcohol-based hand sanitizers.   · Avoid crowds and try to stay at least 6 feet away from other people. · Wash your hands often, especially after you cough or sneeze. Use soap and water, and scrub for at least 20 seconds. If soap and water aren't available, use an alcohol-based hand . · Avoid touching your mouth, nose, and eyes. What can you do to avoid spreading the virus to others? To help avoid spreading the virus to others:  · Cover your mouth with a tissue when you cough or sneeze. Then throw the tissue in the trash. · Use a disinfectant to clean things that you touch often. · Stay home if you are sick or have been exposed to the virus. Don't go to school, work, or public areas. And don't use public transportation. · If you are sick:  ? Leave your home only if you need to get medical care. But call the doctor's office first so they know you're coming. And wear a face mask, if you have one.  ? If you have a face mask, wear it whenever you're around other people. It can help stop the spread of the virus when you cough or sneeze. ? Clean and disinfect your home every day. Use household  and disinfectant wipes or sprays. Take special care to clean things that you grab with your hands. These include doorknobs, remote controls, phones, and handles on your refrigerator and microwave. And don't forget countertops, tabletops, bathrooms, and computer keyboards. When to call for help  Call 911 anytime you think you may need emergency care. For example, call if:  · You have severe trouble breathing. (You can't talk at all.)  · You have constant chest pain or pressure. · You are severely dizzy or lightheaded. · You are confused or can't think clearly. · Your face and lips have a blue color. · You pass out (lose consciousness) or are very hard to wake up. Call your doctor now if you develop symptoms such as:  · Shortness of breath. · Fever. · Cough. If you need to get care, call ahead to the doctor's office for instructions before you go.  Make sure you wear a face mask, if you have one, to prevent exposing other people to the virus. Where can you get the latest information? The following health organizations are tracking and studying this virus. Their websites contain the most up-to-date information. Juanis Crum also learn what to do if you think you may have been exposed to the virus. · U.S. Centers for Disease Control and Prevention (CDC): The CDC provides updated news about the disease and travel advice. The website also tells you how to prevent the spread of infection. www.cdc.gov  · World Health Organization [facility]): WHO offers information about the virus outbreaks. WHO also has travel advice. www.who.int  Current as of: April 1, 2020               Content Version: 12.4  © 2006-2020 Healthwise, Incorporated. Care instructions adapted under license by your healthcare professional. If you have questions about a medical condition or this instruction, always ask your healthcare professional. [place] [address] any warranty or liability for your use of this information. DISCHARGE SUMMARY from Nurse    PATIENT INSTRUCTIONS:    After general anesthesia or intravenous sedation, for 24 hours or while taking prescription Narcotics:  · Limit your activities  · Do not drive and operate hazardous machinery  · Do not make important personal or business decisions  · Do  not drink alcoholic beverages  · If you have not urinated within 8 hours after discharge, please contact your surgeon on call.     Report the following to your surgeon:  · Excessive pain, swelling, redness or odor of or around the surgical area  · Temperature over 100.5  · Nausea and vomiting lasting longer than 4 hours or if unable to take medications  · Any signs of decreased circulation or nerve impairment to extremity: change in color, persistent  numbness, tingling, coldness or increase pain  · Any questions    What to do at Home:  Recommended activity: as above,       *  Please give a list of your current medications to your Primary Care Provider. *  Please update this list whenever your medications are discontinued, doses are      changed, or new medications (including over-the-counter products) are added. *  Please carry medication information at all times in case of emergency situations. These are general instructions for a healthy lifestyle:    No smoking/ No tobacco products/ Avoid exposure to second hand smoke  Surgeon General's Warning:  Quitting smoking now greatly reduces serious risk to your health. Obesity, smoking, and sedentary lifestyle greatly increases your risk for illness    A healthy diet, regular physical exercise & weight monitoring are important for maintaining a healthy lifestyle    You may be retaining fluid if you have a history of heart failure or if you experience any of the following symptoms:  Weight gain of 3 pounds or more overnight or 5 pounds in a week, increased swelling in our hands or feet or shortness of breath while lying flat in bed. Please call your doctor as soon as you notice any of these symptoms; do not wait until your next office visit. The discharge information has been reviewed with the patient and daughter. The patient and daughter verbalized understanding. Discharge medications reviewed with the patient and daughter and appropriate educational materials and side effects teaching were provided.   ___________________________________________________________________________________________________________________________________    Patient armband removed and shredded

## 2021-06-07 NOTE — PROCEDURES
Aleks Jimenez MD, Jacinto Pascual MD, MPH      Marjorie Greenberg, PAARGENTINA Gastelum, ACNP-BC     April S Farhana, AGPCNP-BC   Venecia Pérezty, P-C    Daly Kadenriya, [facility]-BC       Chicho Landry [facility] 136    at [address], [facility] Luigi Salcido  22.    [phone]    FAX: [address] [place] [address], [address]    [phone]    FAX: [phone]         UPPER ENDOSCOPY PROCEDURE NOTE    Reg Tiwari  1965    INDICATION: Cirrhosis. Screening for esophageal varices with variceal ligation if  indicated. : Valente Plascencia MD    SURGICAL ASSISTANT:  None    PROSTHETIC DEVISES, TISSUE GRAFTS, ORGAN TRANSPLANTS:  Not applicable     ANESTHESIA/SEDATION: Sedation per anesthesiology    PROCEDURE DESCRIPTION:  [employer] consent was obtained from the patient for the procedure. All risks and benefits of the procedure explained. The procedure was performed in the endoscopy suite. The patient was laying on a stretcher and moved to the left lateral decubitus position prior to administration of sedation. Sedation was administered by anesthesiology. See their note for details. The endoscope was inserted into the mouth and advanced under direct vision to the second portion of the duodenum. Careful inspection of upper gastrointestinal tract was made as the endoscope was inserted and withdrawn. Retroflexion of the endoscope to view of the cardia of the stomach was performed. The findings and interventions are described below.       FINDINGS:  Esophagus:    Normal.      Stomach:   Mild portal hypertensive gastropathy of the body of the stomach  2 Gastric ulcers with clean base located in the antrum  Gastritis of the antrum  No gastric varices identified. Duodenum:   Normal bulb and second portion    SPECIMENS COLLECTED:   2 biopsies were taken from the antrum. The specimens were placed in preservative and transported to Pathology after the procedure. INTERVENTIONS:  None    COMPLICATIONS: None. The patient tolerated the procedure well. EBL: Negligible. RECOMMENDATIONS:  Observe until discharge parameters are achieved. May resume previous diet. Stop taking NSAIDs. This is probably causing the ulcers  Repeat endoscopy to reassess varices and need for banding in 3 years. Follow-up Liver [facility] office as scheduled.       [place]  [address] [address], [address] [place] [address], [address] [place] [address] - [address]  [address] [place] [address]

## 2021-08-16 ENCOUNTER — HOSPITAL ENCOUNTER (OUTPATIENT)
Dept: LAB | Age: 56
Discharge: HOME | End: 2021-08-16

## 2021-08-16 PROCEDURE — 83550 IRON BINDING TEST: CPT

## 2021-08-16 PROCEDURE — 80061 LIPID PANEL: CPT

## 2021-08-16 PROCEDURE — 85025 COMPLETE CBC W/AUTO DIFF WBC: CPT

## 2021-08-16 PROCEDURE — 80053 COMPREHEN METABOLIC PANEL: CPT

## 2021-08-16 PROCEDURE — 83036 HEMOGLOBIN GLYCOSYLATED A1C: CPT

## 2021-08-17 LAB
ALBUMIN SERPL-MCNC: 2.8 G/DL (ref 3.5–5)
ALBUMIN/GLOB SERPL: 0.5 {RATIO} (ref 1.1–2.2)
ALP SERPL-CCNC: 247 U/L (ref 45–117)
ALT SERPL-CCNC: 58 U/L (ref 12–78)
ANION GAP SERPL CALC-SCNC: 8 MMOL/L (ref 5–15)
AST SERPL-CCNC: 228 U/L (ref 15–37)
BASOPHILS # BLD: 0.1 K/UL (ref 0–0.1)
BASOPHILS NFR BLD: 1 % (ref 0–1)
BILIRUB SERPL-MCNC: 4.7 MG/DL (ref 0.2–1)
BUN SERPL-MCNC: 3 MG/DL (ref 6–20)
BUN/CREAT SERPL: 7 (ref 12–20)
CALCIUM SERPL-MCNC: 8 MG/DL (ref 8.5–10.1)
CHLORIDE SERPL-SCNC: 98 MMOL/L (ref 97–108)
CHOLEST SERPL-MCNC: 154 MG/DL
CO2 SERPL-SCNC: 29 MMOL/L (ref 21–32)
CREAT SERPL-MCNC: 0.42 MG/DL (ref 0.55–1.02)
DIFFERENTIAL METHOD BLD: ABNORMAL
EOSINOPHIL # BLD: 0.1 K/UL (ref 0–0.4)
EOSINOPHIL NFR BLD: 1 % (ref 0–7)
ERYTHROCYTE [DISTWIDTH] IN BLOOD BY AUTOMATED COUNT: 19.5 % (ref 11.5–14.5)
EST. AVERAGE GLUCOSE BLD GHB EST-MCNC: 71 MG/DL
GLOBULIN SER CALC-MCNC: 5.1 G/DL (ref 2–4)
GLUCOSE SERPL-MCNC: 94 MG/DL (ref 65–100)
HBA1C MFR BLD: 4.1 % (ref 4–5.6)
HCT VFR BLD AUTO: 31.7 % (ref 35–47)
HDLC SERPL-MCNC: 25 MG/DL
HDLC SERPL: 6.2 {RATIO} (ref 0–5)
HGB BLD-MCNC: 10.7 G/DL (ref 11.5–16)
IMM GRANULOCYTES # BLD AUTO: 0 K/UL (ref 0–0.04)
IMM GRANULOCYTES NFR BLD AUTO: 0 % (ref 0–0.5)
IRON SATN MFR SERPL: 88 % (ref 20–50)
IRON SERPL-MCNC: 165 UG/DL (ref 50–170)
LDLC SERPL CALC-MCNC: 111.4 MG/DL (ref 0–100)
LYMPHOCYTES # BLD: 1.3 K/UL (ref 0.8–3.5)
LYMPHOCYTES NFR BLD: 17 % (ref 12–49)
MCH RBC QN AUTO: 36.1 PG (ref 26–34)
MCHC RBC AUTO-ENTMCNC: 33.8 G/DL (ref 30–36.5)
MCV RBC AUTO: 107.1 FL (ref 80–99)
MONOCYTES # BLD: 0.5 K/UL (ref 0–1)
MONOCYTES NFR BLD: 7 % (ref 5–13)
NEUTS SEG # BLD: 5.5 K/UL (ref 1.8–8)
NEUTS SEG NFR BLD: 74 % (ref 32–75)
NRBC # BLD: 0 K/UL (ref 0–0.01)
NRBC BLD-RTO: 0 PER 100 WBC
PLATELET # BLD AUTO: 60 K/UL (ref 150–400)
PMV BLD AUTO: 11.3 FL (ref 8.9–12.9)
POTASSIUM SERPL-SCNC: 3.7 MMOL/L (ref 3.5–5.1)
PROT SERPL-MCNC: 7.9 G/DL (ref 6.4–8.2)
RBC # BLD AUTO: 2.96 M/UL (ref 3.8–5.2)
SODIUM SERPL-SCNC: 135 MMOL/L (ref 136–145)
TIBC SERPL-MCNC: 188 UG/DL (ref 250–450)
TRIGL SERPL-MCNC: 88 MG/DL (ref ?–150)
VLDLC SERPL CALC-MCNC: 17.6 MG/DL
WBC # BLD AUTO: 7.4 K/UL (ref 3.6–11)

## 2021-09-30 ENCOUNTER — OFFICE VISIT (OUTPATIENT)
Dept: HEMATOLOGY | Age: 56
End: 2021-09-30
Payer: MEDICAID

## 2021-09-30 VITALS
HEIGHT: 61 IN | OXYGEN SATURATION: 98 % | BODY MASS INDEX: 20.2 KG/M2 | RESPIRATION RATE: 25 BRPM | HEART RATE: 100 BPM | SYSTOLIC BLOOD PRESSURE: 124 MMHG | DIASTOLIC BLOOD PRESSURE: 77 MMHG | TEMPERATURE: 97.4 F | WEIGHT: 107 LBS

## 2021-09-30 DIAGNOSIS — K70.31 ALCOHOLIC CIRRHOSIS OF LIVER WITH ASCITES (HCC): Primary | ICD-10-CM

## 2021-09-30 PROCEDURE — 99215 OFFICE O/P EST HI 40 MIN: CPT | Performed by: NURSE PRACTITIONER

## 2021-09-30 RX ORDER — POTASSIUM CHLORIDE 750 MG/1
TABLET, FILM COATED, EXTENDED RELEASE ORAL
COMMUNITY

## 2021-09-30 RX ORDER — FOLIC ACID 1 MG/1
TABLET ORAL DAILY
COMMUNITY

## 2021-09-30 RX ORDER — FAMOTIDINE 20 MG/1
20 TABLET, FILM COATED ORAL 2 TIMES DAILY
COMMUNITY

## 2021-09-30 RX ORDER — ERGOCALCIFEROL 1.25 MG/1
50000 CAPSULE ORAL
COMMUNITY

## 2021-09-30 NOTE — PROGRESS NOTES
[address] [facility]      Arina Reyna MD, Yashira Kuhn, Ese Franks MD, MPH      Melda Sandifer, YAZAN Lo, [facility]-BC     Bhavana Delcid, [facility]   Maya Mendez FNP-C    Anne Marie Joy, [facility]       Chicho Landry [facility] 136    at [address], [facility] Luigi Salcido  22.    [phone]    FAX: [address] [place] [address], [address]    [phone]    FAX: [phone]       Patient Care Team:  Claudia Bauer MD as PCP - General (Internal Medicine)      Problem List  Date Reviewed: 5/14/2021        Codes Class Noted    Ascites ICD-10-CM: R18.8  ICD-9-CM: 789.59  2/14/0305        Alcoholic liver disease ([facility].) ICD-10-CM: K70.9  ICD-9-CM: 571.3  5/14/2021        Ventral hernia without obstruction or gangrene ICD-10-CM: K43.9  ICD-9-CM: 553.20  4/28/2021        Cirrhosis ([facility] ICD-10-CM: K74.60  ICD-9-CM: 571.5  4/28/2021        Alcohol abuse, in remission ICD-10-CM: F10.11  ICD-9-CM: 305.03  4/28/2021              Lexie Rodriguez is being seen at [address] for management of cirrhosis secondary to alcohol. The active problem list, all pertinent past medical history, medications, liver histology, endoscopic studies, radiologic findings, and laboratory findings related to the liver disorder were reviewed and discussed with the patient. The patient is a 60 y.o. female who was found to have chronic liver disease and cirrhosis in 3/2021 when she underwent a liver ultrasound. Since last visit: patient was ER for paracentesis. Serologic evaluation for markers of chronic liver disease has either not been performed or the results are not available.       The patient has developed the following complications of cirrhosis: ascites    The patient does not have any symptoms which could be attributed to the liver disorder. The patient is not experiencing the following symptoms which are commonly seen in this liver disorder:   fatigue, pain in the right side over the liver    The patient completes all daily activities without any functional limitations. ASSESSMENT AND PLAN:  Cirrhosis  The diagnosis of cirrhosis is based upon imaging, laboratory studies, complications of cirrhosis. Cirrhosis is secondary to alcohol. The patient has near normal liver function. The CTP is 6. Child class A. The MELD score is 11. Have performed laboratory testing to monitor liver function and degree of liver injury. This included BMP, hepatic panel, CBC with platelet count, INR. AST is elevated. ALT is normal.  ALP is elevated. Liver function is normal.  Total bilirubin is   elevated. The platelet count is depressed. Alcohol liver disease  Suspect the patient has alcohol induced liver disease based upon a history of consuming significant alcohol on a daily basis for many years, pattern of AST>ALT, serology that is negative for other causes of chronic liver disease. The patient has consumed 6-29 alcoholic beverages daily. The patient has been abstinent from alcohol since 4/2021. Discussed the need to remain alcohol free long term. Ascites   Ascites is refractory to current doses of diuretics because of hyponatremia. The patient reports aldactone was discontinued at the hospital.  Paracentesis is being performed as needed. Will need to consider placement of TIPS. Have ordered ECHO. The patient was counseled regarding the need to maintain sodium restriction and the types of foods containing high amounts of sodium to be avoided. Attempted to arrange paracentesis however patient states [facility] too far away because of transportation issues. Will try to get done closer to her home. Lower extremity edema  Edema has resolved. The renal function is normal and edema should be able to be controled with diuretics. Screening for Esophageal varices   The last EGD to assess for varices was performed in 6/2021. Will schedule for EGD to assess for varices in 6/2024    Hepatic encephalopathy   Overt HE has not developed to date. There is no reason for treatment with lactulose or xifaxan. There is no need to restrict dietary protein at this time. Thrombocytopenia   This is secondary to cirrhosis. There is no evidence of overt bleeding. No treatment is required. The platelet count is adequate for the patient to undergo procedures without the need for platelet transfusion or platelet growth factors. Screening for Hepatocellular Carcinoma  HCC screening has recently been performed and does not suggest [facility] [address] The next liver imaging study will be performed in 9/2021. Treatment of other medical problems in patients with chronic liver disease  There are no contraindications for the patient to take most medications that are necessary for treatment of other medical issues. Counseling for alcohol in patients with chronic liver disease  The patient was counseled regarding alcohol consumption and the effect of alcohol on chronic liver disease. The patient has not consumed alcohol since 8/2021    Osteoporosis  The risk of osteoporosis is increased in patients with cirrhosis. DEXA bone density to assess for osteoporosis has not been performed. This should be ordered by the patients primary care physician. Vaccinations   Vaccination for viral hepatitis A and B is not needed. The patient has serologic evidence of prior exposure or vaccination with immunity. Routine vaccinations against other bacterial and viral agents can be performed as indicated. Annual flu vaccination should be administered if indicated.       ALLERGIES  No Known Allergies    MEDICATIONS  Current Outpatient Medications   Medication Sig    folic acid (FOLVITE) 1 mg tablet Take  by mouth daily.  famotidine (PEPCID) 20 mg tablet Take 20 mg by mouth two (2) times a day.  ergocalciferol (Vitamin D2) 1,250 mcg (50,000 unit) capsule Take 50,000 Units by mouth.  potassium chloride SR (KLOR-CON 10) 10 mEq tablet Take  by mouth.  ibuprofen (AdviL) 200 mg tablet Take 200 mg by mouth.  FLUoxetine (PROzac) 20 mg capsule Take 20 mg by mouth daily.  b complex-vitamin c-folic acid (NEPHROCAPS) 1 mg capsule Take 1 Cap by mouth daily.  spironolactone (ALDACTONE) 25 mg tablet Take 25 mg by mouth as needed. (Patient not taking: Reported on 2021)     No current facility-administered medications for this visit. SYSTEM REVIEW NOT RELATED TO LIVER DISEASE OR REVIEWED ABOVE:  Constitution systems: Negative for fever, chills, weight gain, weight loss. Eyes: Negative for visual changes. ENT: Negative for sore throat, painful swallowing. Respiratory: Negative for cough, hemoptysis, SOB. Cardiology: Negative for chest pain, palpitations. GI:  Negative for constipation or diarrhea. : Negative for urinary frequency, dysuria, hematuria, nocturia. Skin: Negative for rash. Hematology: Negative for easy bruising, blood clots. Musculo-skelatal: Negative for back pain, muscle pain, weakness. Neurologic: Negative for headaches, dizziness, vertigo, memory problems not related to HE. Psychology: Negative for anxiety, depression. FAMILY HISTORY:  The father  of MI. The mother  of DM. There is no family history of liver disease. SOCIAL HISTORY:  The patient is . The patient has 1 child. The patient has never used tobacco products. The patient consumes 4-43 alcoholic beverages per day   The patient has been abstinent from alcohol since 2021   The patient used to work as a . The patient has not worked since 2021.         PHYSICAL EXAMINATION:  Visit Vitals  /77 (BP 1 Location: Left upper arm, BP Patient Position: Sitting, BP Cuff Size: Small adult)   Pulse 100   Temp 97.4 °F (36.3 °C)   Resp 25   Ht 5' 1\" (1.549 m)   Wt 107 lb (48.5 kg)   SpO2 98%   BMI 20.22 kg/m²     General: No acute distress. Eyes: Sclera anicteric. ENT: No oral lesions. Thyroid normal.  Nodes: No adenopathy. Skin: No spider angiomata. No jaundice. No palmar erythema. Respiratory: Lungs clear to auscultation. Cardiovascular: Regular heart rate. No murmurs. No JVD. Abdomen: Soft non-tender. Liver size normal to percussion/palpation. Spleen not palpable. No obvious ascites. Umbilical hernia  Extremities: No edema. No muscle wasting. No gross arthritic changes. Neurologic: Alert and oriented. Cranial nerves grossly intact. No asterixis.     LABORATORY STUDIES:  Liver [place] [address] & Units 8/16/2021   WBC 3.6 - 11.0 K/uL 7.4   ANC 1.8 - 8.0 K/UL 5.5   HGB 11.5 - 16.0 g/dL 10.7 (L)    - 400 K/uL 60 (L)   AST 15 - 37 U/L 228 (H)   ALT 12 - 78 U/L 58   Alk Phos 45 - 117 U/L 247 (H)   Bili, Total 0.2 - 1.0 MG/DL 4.7 (H)   Albumin 3.5 - 5.0 g/dL 2.8 (L)   BUN 6 - 20 MG/DL 3 (L)   Creat 0.55 - 1.02 MG/DL 0.42 (L)   Na 136 - 145 mmol/L 135 (L)   K 3.5 - 5.1 mmol/L 3.7   Cl 97 - 108 mmol/L 98   CO2 21 - 32 mmol/L 29   Glucose 65 - 100 mg/dL 94     From 9/2021  AST/ALT/ALP/T Bili/ALB: 109/33/124/4.9/2.4  WBC/HB/PLT/INR: 10.6/10.8/185/1.41  NA/BUN/CREAT: 129/9/0.63    Cancer Screening Latest Ref Rng & Units 5/5/2021   AFP, Serum 0.0 - 8.0 ng/mL 12.1 (H)   AFP-L3% 0.0 - 9.9 % Comment     SEROLOGIES:  Serologies Latest Ref Rng & Units 5/5/2021   Hep A Ab, Total Negative Positive (A)   Hep B Surface Ag Negative Negative   Hep B Core Ab, Total Negative Negative   Hep B Surface AB QL  Reactive   Hep C Ab 0.0 - 0.9 s/co ratio <0.1   Ferritin 15 - 150 ng/mL 128   Iron % Saturation 15 - 55 % 31   MULUGETA, IFA  Negative   ASMCA 0 - 19 Units 9   Ceruloplasmin 19.0 - 39.0 mg/dL 25.5   Alpha-1 antitrypsin level 101 - 187 mg/dL 198 (H)     LIVER HISTOLOGY:  Not available or performed    ENDOSCOPIC PROCEDURES:  Not available or performed    RADIOLOGY:  3/2021. Ultrasound of liver. Echogenic consistent with cirrhosis. No liver mass lesions. No dilated bile ducts. Mild ascites. OTHER TESTING:  Not available or performed    FOLLOW-UP:  All of the issues listed above in the Assessment and Plan were discussed with the patient. All questions were answered. The patient expressed a clear understanding of the above. [address] [place] [address] in 4 weeks for monitoring.       Servando Couch, AGPCNP-BC  [address] Liver [place] [address]  [address] [place] [address] [facility], [address]  [phone]

## 2021-10-11 ENCOUNTER — HOSPITAL ENCOUNTER (OUTPATIENT)
Dept: NON INVASIVE DIAGNOSTICS | Age: 56
Discharge: HOME | End: 2021-10-11
Attending: NURSE PRACTITIONER
Payer: MEDICAID

## 2021-10-11 VITALS
HEIGHT: 61 IN | BODY MASS INDEX: 20.2 KG/M2 | DIASTOLIC BLOOD PRESSURE: 79 MMHG | WEIGHT: 107 LBS | SYSTOLIC BLOOD PRESSURE: 127 MMHG

## 2021-10-11 DIAGNOSIS — K70.31 ALCOHOLIC CIRRHOSIS OF LIVER WITH ASCITES (HCC): ICD-10-CM

## 2021-10-11 LAB
ECHO AO ROOT DIAM: 3.25 CM
ECHO AV AREA PEAK VELOCITY: 3.07 CM2
ECHO AV AREA VTI: 2.75 CM2
ECHO AV AREA/BSA PEAK VELOCITY: 2.1 CM2/M2
ECHO AV AREA/BSA VTI: 1.9 CM2/M2
ECHO AV MEAN GRADIENT: 3.21 MMHG
ECHO AV PEAK GRADIENT: 5.66 MMHG
ECHO AV PEAK VELOCITY: 118.9 CM/S
ECHO AV VTI: 25.8 CM
ECHO LA AREA 4C: 12.3 CM2
ECHO LA VOL 2C: 18.38 ML (ref 22–52)
ECHO LA VOL 4C: 27.34 ML (ref 22–52)
ECHO LA VOL BP: 26.05 ML (ref 22–52)
ECHO LA VOL/BSA BIPLANE: 17.97 ML/M2 (ref 16–28)
ECHO LA VOLUME INDEX A2C: 12.68 ML/M2 (ref 16–28)
ECHO LA VOLUME INDEX A4C: 18.86 ML/M2 (ref 16–28)
ECHO LV E' LATERAL VELOCITY: 15.75 CM/S
ECHO LV E' SEPTAL VELOCITY: 9.24 CM/S
ECHO LV EDV A2C: 76.22 ML
ECHO LV EDV A4C: 73.68 ML
ECHO LV EDV BP: 77.88 ML (ref 56–104)
ECHO LV EDV INDEX A4C: 50.8 ML/M2
ECHO LV EDV INDEX BP: 53.7 ML/M2
ECHO LV EDV NDEX A2C: 52.6 ML/M2
ECHO LV EJECTION FRACTION A2C: 46 PERCENT
ECHO LV EJECTION FRACTION A4C: 49 PERCENT
ECHO LV EJECTION FRACTION BIPLANE: 48.9 PERCENT (ref 55–100)
ECHO LV ESV A2C: 41.02 ML
ECHO LV ESV A4C: 37.6 ML
ECHO LV ESV BP: 39.83 ML (ref 19–49)
ECHO LV ESV INDEX A2C: 28.3 ML/M2
ECHO LV ESV INDEX A4C: 25.9 ML/M2
ECHO LV ESV INDEX BP: 27.5 ML/M2
ECHO LV GLOBAL LONGITUDINAL STRAIN (GLS): -23.6 PERCENT
ECHO LV INTERNAL DIMENSION DIASTOLIC: 4.63 CM (ref 3.9–5.3)
ECHO LV INTERNAL DIMENSION SYSTOLIC: 3.3 CM
ECHO LV IVSD: 0.7 CM (ref 0.6–0.9)
ECHO LV MASS 2D: 111.6 G (ref 67–162)
ECHO LV MASS INDEX 2D: 76.9 G/M2 (ref 43–95)
ECHO LV POSTERIOR WALL DIASTOLIC: 0.82 CM (ref 0.6–0.9)
ECHO LVOT CARDIAC OUTPUT: 5.4 LITER/MINUTE
ECHO LVOT DIAM: 2.04 CM
ECHO LVOT PEAK GRADIENT: 5.01 MMHG
ECHO LVOT PEAK VELOCITY: 111.4 CM/S
ECHO LVOT SV: 35.2 ML
ECHO LVOT SV: 71 ML
ECHO LVOT VTI: 21.68 CM
ECHO MV A VELOCITY: 83.38 CM/S
ECHO MV E DECELERATION TIME (DT): 171.55 MS
ECHO MV E VELOCITY: 60.09 CM/S
ECHO MV E/A RATIO: 0.72
ECHO MV E/E' LATERAL: 3.82
ECHO MV E/E' RATIO (AVERAGED): 5.16
ECHO MV E/E' SEPTAL: 6.5
ECHO RA AREA 4C: 8.85 CM2
ECHO RV INTERNAL DIMENSION: 2.22 CM
ECHO RV TAPSE: 1.61 CM (ref 1.5–2)
GLOBAL LONGITUDINAL STRAIN 2 CHAMBER: -24.5 PERCENT
GLOBAL LONGITUDINAL STRAIN 4 CHAMBER: -24.1 PERCENT
GLOBAL LONGITUDINAL STRAIN LONG AXIS: -22.1 PERCENT
LA VOL DISK BP: 23.75 ML (ref 22–52)
LVOT MG: 2.9 MMHG

## 2021-10-11 PROCEDURE — 93306 TTE W/DOPPLER COMPLETE: CPT

## 2021-10-28 ENCOUNTER — HOSPITAL ENCOUNTER (OUTPATIENT)
Dept: LAB | Age: 56
Discharge: HOME | End: 2021-10-28

## 2021-10-28 ENCOUNTER — OFFICE VISIT (OUTPATIENT)
Dept: HEMATOLOGY | Age: 56
End: 2021-10-28
Payer: MEDICAID

## 2021-10-28 VITALS
WEIGHT: 102 LBS | SYSTOLIC BLOOD PRESSURE: 120 MMHG | HEART RATE: 111 BPM | TEMPERATURE: 97.6 F | DIASTOLIC BLOOD PRESSURE: 70 MMHG | OXYGEN SATURATION: 99 % | BODY MASS INDEX: 19.26 KG/M2 | HEIGHT: 61 IN | RESPIRATION RATE: 26 BRPM

## 2021-10-28 DIAGNOSIS — K70.31 ALCOHOLIC CIRRHOSIS OF LIVER WITH ASCITES (HCC): Primary | ICD-10-CM

## 2021-10-28 LAB — XX-LABCORP SPECIMEN COL,LCBCF: NORMAL

## 2021-10-28 PROCEDURE — 99215 OFFICE O/P EST HI 40 MIN: CPT | Performed by: NURSE PRACTITIONER

## 2021-10-28 PROCEDURE — 99001 SPECIMEN HANDLING PT-LAB: CPT

## 2021-10-28 RX ORDER — SPIRONOLACTONE 25 MG/1
TABLET ORAL
COMMUNITY
Start: 2021-10-10 | End: 2021-11-04 | Stop reason: SDUPTHER

## 2021-10-28 NOTE — PROGRESS NOTES
[address] [facility]      Francesca Damian MD, Jovana Clemente, Wilian Gallardo MD, MPH      Iveth Bojorquez, YAZAN Becerra, [facility]     Bhavana Delcid, [facility]   Ava Chow, P-C    Dayton Castro, [facility]       Chicho Landry Shady [address]    at [address], [facility] Luigi Salcido  22.    [phone]    FAX: [address] [place] [address] [address], [address]    [phone]    FAX: [phone]       Patient Care Team:  Zaida Johnson MD as PCP - General (Internal Medicine)      Problem List  Date Reviewed: 10/28/2021        Codes Class Noted    Ascites ICD-10-CM: R18.8  ICD-9-CM: 789.59  0/09/2210        Alcoholic liver disease ([facility] 75.) ICD-10-CM: K70.9  ICD-9-CM: 571.3  5/14/2021        Ventral hernia without obstruction or gangrene ICD-10-CM: K43.9  ICD-9-CM: 553.20  4/28/2021        Cirrhosis ([facility] ICD-10-CM: K74.60  ICD-9-CM: 571.5  4/28/2021        Alcohol abuse, in remission ICD-10-CM: F10.11  ICD-9-CM: 305.03  4/28/2021              Tootie Hart is being seen at [facility] for management of cirrhosis secondary to alcohol. The active problem list, all pertinent past medical history, medications, liver histology, endoscopic studies, radiologic findings, and laboratory findings related to the liver disorder were reviewed and discussed with the patient. The patient is a 60 y.o. female who was found to have chronic liver disease and cirrhosis in 3/2021 when she underwent a liver ultrasound. Serologic evaluation for markers of chronic liver disease has either not been performed or the results are not available.       The patient has developed the following complications of cirrhosis: ascites    The patient does not have any symptoms which could be attributed to the liver disorder. The patient is not experiencing the following symptoms which are commonly seen in this liver disorder:   fatigue, pain in the right side over the liver    The patient completes all daily activities without any functional limitations. ASSESSMENT AND PLAN:  Cirrhosis  The diagnosis of cirrhosis is based upon imaging, laboratory studies, complications of cirrhosis. Cirrhosis is secondary to alcohol. The patient has near normal liver function. The CTP is 6. Child class A. The MELD score is 11. Have performed laboratory testing to monitor liver function and degree of liver injury. This included BMP, hepatic panel, CBC with platelet count, INR. AST is elevated. ALT is normal.  ALP is elevated. Liver function is normal.  Total bilirubin is   elevated. The platelet count is depressed. Alcohol liver disease  Suspect the patient has alcohol induced liver disease based upon a history of consuming significant alcohol on a daily basis for many years, pattern of AST>ALT, serology that is negative for other causes of chronic liver disease. The patient had consumed 7-76 alcoholic beverages daily. The patient has been abstinent from alcohol since 8/2021. Discussed the need to remain alcohol free long term. Ascites   Ascites is refractory to current doses of diuretics because of hyponatremia. The patient reports aldactone was discontinued at the hospital.  Paracentesis is being performed as needed. Will need to consider placement of TIPS. Have ordered ECHO. The patient was counseled regarding the need to maintain sodium restriction and the types of foods containing high amounts of sodium to be avoided. Attempted to arrange paracentesis however patient states [facility] too far away because of transportation issues. Will try to get done closer to her home. Lower extremity edema  Edema has resolved.   The renal function is normal and edema should be able to be controled with diuretics. Screening for Esophageal varices   The last EGD to assess for varices was performed in 6/2021. Will schedule for EGD to assess for varices in 6/2024    Hepatic encephalopathy   Overt HE has not developed to date. There is no reason for treatment with lactulose or xifaxan. There is no need to restrict dietary protein at this time. Thrombocytopenia   This is secondary to cirrhosis. There is no evidence of overt bleeding. No treatment is required. The platelet count is adequate for the patient to undergo procedures without the need for platelet transfusion or platelet growth factors. Screening for Hepatocellular Carcinoma  HCC screening has recently been performed and does not suggest [facility] The next liver imaging study will be performed in 9/2021. Treatment of other medical problems in patients with chronic liver disease  There are no contraindications for the patient to take most medications that are necessary for treatment of other medical issues. Counseling for alcohol in patients with chronic liver disease  The patient was counseled regarding alcohol consumption and the effect of alcohol on chronic liver disease. The patient has not consumed alcohol since 8/2021    Osteoporosis  The risk of osteoporosis is increased in patients with cirrhosis. DEXA bone density to assess for osteoporosis has not been performed. This should be ordered by the patients primary care physician. Vaccinations   Vaccination for viral hepatitis A and B is not needed. The patient has serologic evidence of prior exposure or vaccination with immunity. Routine vaccinations against other bacterial and viral agents can be performed as indicated. Annual flu vaccination should be administered if indicated.       ALLERGIES  No Known Allergies    MEDICATIONS  Current Outpatient Medications   Medication Sig    spironolactone (ALDACTONE) 25 mg tablet     folic acid (FOLVITE) 1 mg tablet Take  by mouth daily.  famotidine (PEPCID) 20 mg tablet Take 20 mg by mouth two (2) times a day.  ergocalciferol (Vitamin D2) 1,250 mcg (50,000 unit) capsule Take 50,000 Units by mouth.  potassium chloride SR (KLOR-CON 10) 10 mEq tablet Take  by mouth.  b complex-vitamin c-folic acid (NEPHROCAPS) 1 mg capsule Take 1 Cap by mouth daily. No current facility-administered medications for this visit. SYSTEM REVIEW NOT RELATED TO LIVER DISEASE OR REVIEWED ABOVE:  Constitution systems: Negative for fever, chills, weight gain, weight loss. Eyes: Negative for visual changes. ENT: Negative for sore throat, painful swallowing. Respiratory: Negative for cough, hemoptysis, SOB. Cardiology: Negative for chest pain, palpitations. GI:  Negative for constipation or diarrhea. : Negative for urinary frequency, dysuria, hematuria, nocturia. Skin: Negative for rash. Hematology: Negative for easy bruising, blood clots. Musculo-skelatal: Negative for back pain, muscle pain, weakness. Neurologic: Negative for headaches, dizziness, vertigo, memory problems not related to HE. Psychology: Negative for anxiety, depression. FAMILY HISTORY:  The father  of MI. The mother  of DM. There is no family history of liver disease. SOCIAL HISTORY:  The patient is . The patient has 1 child. The patient has never used tobacco products. The patient previously consumed 6-06 alcoholic beverages per day   The patient has been abstinent from alcohol since 2021   The patient used to work as a . The patient has not worked since 2021.         PHYSICAL EXAMINATION:  Visit Vitals  /70 (BP 1 Location: Left upper arm, BP Patient Position: Sitting, BP Cuff Size: Small adult)   Pulse (!) 111   Temp 97.6 °F (36.4 °C)   Resp 26   Ht 5' 1\" (1.549 m)   Wt 102 lb (46.3 kg)   SpO2 99%   BMI 19.27 kg/m² General: No acute distress. Eyes: Sclera anicteric. ENT: No oral lesions. Thyroid normal.  Nodes: No adenopathy. Skin: No spider angiomata. No jaundice. No palmar erythema. Respiratory: Lungs clear to auscultation. Cardiovascular: Regular heart rate. No murmurs. No JVD. Abdomen: Soft non-tender. Liver size normal to percussion/palpation. Spleen not palpable. No obvious ascites. Umbilical hernia  Extremities: No edema. No muscle wasting. No gross arthritic changes. Neurologic: Alert and oriented. Cranial nerves grossly intact. No asterixis.     LABORATORY STUDIES:  Liver [place] [address] & Units 10/28/2021 8/16/2021   WBC 3.4 - 10.8 x10E3/uL 5.4 7.4   ANC 1.4 - 7.0 x10E3/uL 3.3 5.5   HGB 11.1 - 15.9 g/dL 11.8 10.7 (L)    - 450 x10E3/uL 142 (L) 60 (L)   INR 0.9 - 1.2 1.1    AST 0 - 40 IU/L 38 228 (H)   ALT 0 - 32 IU/L 18 58   Alk Phos 44 - 121 IU/L 143 (H) 247 (H)   Bili, Total 0.0 - 1.2 mg/dL 1.3 (H) 4.7 (H)   Bili, Direct 0.00 - 0.40 mg/dL 0.69 (H)    Albumin 3.8 - 4.9 g/dL 2.1 (L) 2.8 (L)   BUN 6 - 24 mg/dL 8 3 (L)   Creat 0.57 - 1.00 mg/dL 0.73 0.42 (L)   Na 134 - 144 mmol/L 137 135 (L)   K 3.5 - 5.2 mmol/L 4.2 3.7   Cl 96 - 106 mmol/L 103 98   CO2 20 - 29 mmol/L 22 29   Glucose 65 - 99 mg/dL 55 (L) 94     From 9/2021  AST/ALT/ALP/T Bili/ALB: 109/33/124/4.9/2.4  WBC/HB/PLT/INR: 10.6/10.8/185/1.41  NA/BUN/CREAT: 129/9/0.63    Cancer Screening Latest Ref Rng & Units 5/5/2021   AFP, Serum 0.0 - 8.0 ng/mL 12.1 (H)   AFP-L3% 0.0 - 9.9 % Comment     SEROLOGIES:  Serologies Latest Ref Rng & Units 5/5/2021   Hep A Ab, Total Negative Positive (A)   Hep B Surface Ag Negative Negative   Hep B Core Ab, Total Negative Negative   Hep B Surface AB QL  Reactive   Hep C Ab 0.0 - 0.9 s/co ratio <0.1   Ferritin 15 - 150 ng/mL 128   Iron % Saturation 15 - 55 % 31   MULUGETA, IFA  Negative   ASMCA 0 - 19 Units 9   Ceruloplasmin 19.0 - 39.0 mg/dL 25.5   Alpha-1 antitrypsin level 101 - 187 mg/dL 198 (H)     LIVER HISTOLOGY:  Not available or performed    ENDOSCOPIC PROCEDURES:  Not available or performed    RADIOLOGY:  3/2021. Ultrasound of liver. Echogenic consistent with cirrhosis. No liver mass lesions. No dilated bile ducts. Mild ascites. OTHER TESTING:  Not available or performed    FOLLOW-UP:  All of the issues listed above in the Assessment and Plan were discussed with the patient. All questions were answered. The patient expressed a clear understanding of the above. [address] [address] in 4 weeks for monitoring.       MENDEZ Casanova  [address] [facility] Liver [place] [address]  [place], [address] [place] [address] Street - [address]  [phone]

## 2021-10-29 LAB
ALBUMIN SERPL-MCNC: 2.1 G/DL (ref 3.8–4.9)
ALP SERPL-CCNC: 143 IU/L (ref 44–121)
ALT SERPL-CCNC: 18 IU/L (ref 0–32)
AST SERPL-CCNC: 38 IU/L (ref 0–40)
BASOPHILS # BLD AUTO: 0.1 X10E3/UL (ref 0–0.2)
BASOPHILS NFR BLD AUTO: 1 %
BILIRUB DIRECT SERPL-MCNC: 0.69 MG/DL (ref 0–0.4)
BILIRUB SERPL-MCNC: 1.3 MG/DL (ref 0–1.2)
BUN SERPL-MCNC: 8 MG/DL (ref 6–24)
BUN/CREAT SERPL: 11 (ref 9–23)
CALCIUM SERPL-MCNC: 8.8 MG/DL (ref 8.7–10.2)
CHLORIDE SERPL-SCNC: 103 MMOL/L (ref 96–106)
CO2 SERPL-SCNC: 22 MMOL/L (ref 20–29)
CREAT SERPL-MCNC: 0.73 MG/DL (ref 0.57–1)
EOSINOPHIL # BLD AUTO: 0.2 X10E3/UL (ref 0–0.4)
EOSINOPHIL NFR BLD AUTO: 3 %
ERYTHROCYTE [DISTWIDTH] IN BLOOD BY AUTOMATED COUNT: 11.9 % (ref 11.7–15.4)
GLUCOSE SERPL-MCNC: 55 MG/DL (ref 65–99)
HCT VFR BLD AUTO: 34.1 % (ref 34–46.6)
HGB BLD-MCNC: 11.8 G/DL (ref 11.1–15.9)
IMM GRANULOCYTES # BLD AUTO: 0 X10E3/UL (ref 0–0.1)
IMM GRANULOCYTES NFR BLD AUTO: 1 %
INR PPP: 1.1 (ref 0.9–1.2)
LYMPHOCYTES # BLD AUTO: 1.1 X10E3/UL (ref 0.7–3.1)
LYMPHOCYTES NFR BLD AUTO: 21 %
MCH RBC QN AUTO: 35 PG (ref 26.6–33)
MCHC RBC AUTO-ENTMCNC: 34.6 G/DL (ref 31.5–35.7)
MCV RBC AUTO: 101 FL (ref 79–97)
MONOCYTES # BLD AUTO: 0.7 X10E3/UL (ref 0.1–0.9)
MONOCYTES NFR BLD AUTO: 12 %
NEUTROPHILS # BLD AUTO: 3.3 X10E3/UL (ref 1.4–7)
NEUTROPHILS NFR BLD AUTO: 62 %
PLATELET # BLD AUTO: 142 X10E3/UL (ref 150–450)
POTASSIUM SERPL-SCNC: 4.2 MMOL/L (ref 3.5–5.2)
PROT SERPL-MCNC: 6.8 G/DL (ref 6–8.5)
PROTHROMBIN TIME: 11.9 SEC (ref 9.1–12)
RBC # BLD AUTO: 3.37 X10E6/UL (ref 3.77–5.28)
SODIUM SERPL-SCNC: 137 MMOL/L (ref 134–144)
WBC # BLD AUTO: 5.4 X10E3/UL (ref 3.4–10.8)

## 2021-11-04 ENCOUNTER — HOSPITAL ENCOUNTER (OUTPATIENT)
Dept: ULTRASOUND IMAGING | Age: 56
Discharge: HOME | End: 2021-11-04
Attending: NURSE PRACTITIONER
Payer: MEDICAID

## 2021-11-04 DIAGNOSIS — K70.31 ALCOHOLIC CIRRHOSIS OF LIVER WITH ASCITES (HCC): ICD-10-CM

## 2021-11-04 PROCEDURE — 76705 ECHO EXAM OF ABDOMEN: CPT

## 2021-11-04 RX ORDER — SPIRONOLACTONE 25 MG/1
25 TABLET ORAL DAILY
Qty: 30 TABLET | Refills: 3 | Status: SHIPPED | OUTPATIENT
Start: 2021-11-04 | End: 2021-11-16 | Stop reason: SDUPTHER

## 2021-11-09 ENCOUNTER — TRANSCRIBE ORDER (OUTPATIENT)
Dept: INTERVENTIONAL RADIOLOGY/VASCULAR | Age: 56
End: 2021-11-09

## 2021-11-09 DIAGNOSIS — K74.60 CIRRHOSIS (HCC): Primary | ICD-10-CM

## 2021-11-10 ENCOUNTER — TRANSCRIBE ORDER (OUTPATIENT)
Dept: INTERVENTIONAL RADIOLOGY/VASCULAR | Age: 56
End: 2021-11-10

## 2021-11-10 DIAGNOSIS — R18.8 ASCITES: Primary | ICD-10-CM

## 2021-11-12 RX ORDER — CEFAZOLIN SODIUM 1 G/3ML
2 INJECTION, POWDER, FOR SOLUTION INTRAMUSCULAR; INTRAVENOUS ONCE
Status: CANCELLED | OUTPATIENT
Start: 2021-12-01 | End: 2021-12-01

## 2021-11-12 RX ORDER — ALBUMIN HUMAN 250 G/1000ML
25 SOLUTION INTRAVENOUS
Status: CANCELLED | OUTPATIENT
Start: 2021-12-01

## 2021-11-16 ENCOUNTER — OFFICE VISIT (OUTPATIENT)
Dept: HEMATOLOGY | Age: 56
End: 2021-11-16
Payer: MEDICAID

## 2021-11-16 ENCOUNTER — HOSPITAL ENCOUNTER (OUTPATIENT)
Dept: LAB | Age: 56
Discharge: HOME | End: 2021-11-16

## 2021-11-16 VITALS
WEIGHT: 96 LBS | OXYGEN SATURATION: 98 % | SYSTOLIC BLOOD PRESSURE: 140 MMHG | TEMPERATURE: 98.3 F | HEIGHT: 61 IN | RESPIRATION RATE: 35 BRPM | DIASTOLIC BLOOD PRESSURE: 76 MMHG | HEART RATE: 140 BPM | BODY MASS INDEX: 18.12 KG/M2

## 2021-11-16 DIAGNOSIS — K70.31 ALCOHOLIC CIRRHOSIS OF LIVER WITH ASCITES (HCC): Primary | ICD-10-CM

## 2021-11-16 LAB — XX-LABCORP SPECIMEN COL,LCBCF: NORMAL

## 2021-11-16 PROCEDURE — 99001 SPECIMEN HANDLING PT-LAB: CPT

## 2021-11-16 PROCEDURE — 99214 OFFICE O/P EST MOD 30 MIN: CPT | Performed by: NURSE PRACTITIONER

## 2021-11-16 RX ORDER — SPIRONOLACTONE 50 MG/1
50 TABLET, FILM COATED ORAL DAILY
Qty: 30 TABLET | Refills: 3 | Status: SHIPPED | OUTPATIENT
Start: 2021-11-16 | End: 2022-04-06

## 2021-11-16 NOTE — PROGRESS NOTES
[address] [facility]      Migue Stephens MD, Gera Zaragoza MD, MPH      Monica Rm, PA-TITA Ricardo, [facility]     Bhavana Delcid, [facility]   Jeremy Valentino, P-C    Susan Christian, [facility]       Chicho Landry [facility] 136    at [address], [facility] Luigi Salcido  22.    [phone]    FAX: [address] [place] [address] [address], [address]    [phone]    FAX: [phone]       Patient Care Team:  Soumya Caro MD as PCP - General (Internal Medicine)      Problem List  Date Reviewed: 11/16/2021          Codes Class Noted    Ascites ICD-10-CM: R18.8  ICD-9-CM: 789.59  1/97/7357        Alcoholic liver disease ([facility] ICD-10-CM: K70.9  ICD-9-CM: 571.3  5/14/2021        Ventral hernia without obstruction or gangrene ICD-10-CM: K43.9  ICD-9-CM: 553.20  4/28/2021        Cirrhosis ([facility] ICD-10-CM: K74.60  ICD-9-CM: 571.5  4/28/2021        Alcohol abuse, in remission ICD-10-CM: F10.11  ICD-9-CM: 305.03  4/28/2021              Kellen Almazan is being seen at [facility] for management of cirrhosis secondary to alcohol. The active problem list, all pertinent past medical history, medications, liver histology, endoscopic studies, radiologic findings, and laboratory findings related to the liver disorder were reviewed and discussed with the patient. The patient is a 60 y.o. female who was found to have chronic liver disease and cirrhosis in 3/2021 when she underwent a liver ultrasound. Serologic evaluation for markers of chronic liver disease were negative.     The patient has developed the following complications of cirrhosis: ascites    The patient has the following symptoms which could be attributed to the liver disorder: swelling of the abdomen    The patient is not experiencing the following symptoms which are commonly seen in this liver disorder:   fatigue, pain in the right side over the liver    The patient completes all daily activities without any functional limitations. ASSESSMENT AND PLAN:  Cirrhosis  The diagnosis of cirrhosis is based upon imaging, laboratory studies, complications of cirrhosis. Cirrhosis is secondary to alcohol. The patient has near normal liver function. The CTP is 6. Child class A. The MELD score is 11. Have performed laboratory testing to monitor liver function and degree of liver injury. This included BMP, hepatic panel, CBC with platelet count, INR. AST is elevated. ALT is normal.  ALP is elevated. Liver function is normal.  Total bilirubin is   elevated. The platelet count is depressed. Alcohol liver disease  Suspect the patient has alcohol induced liver disease based upon a history of consuming significant alcohol on a daily basis for many years, pattern of AST>ALT, serology that is negative for other causes of chronic liver disease. The patient had consumed 8-73 alcoholic beverages daily. The patient has been abstinent from alcohol since 8/2021. Discussed the need to remain alcohol free long term. Ascites   Ascites is refractory to current doses of diuretics because of hyponatremia. The patient reports aldactone was discontinued at the hospital.  Paracentesis is being performed as needed. Patient is scheduled for placement of TIPS 12/2021. ECHO performed 10/2021 was normal.    The patient was counseled regarding the need to maintain sodium restriction and the types of foods containing high amounts of sodium to be avoided. Lower extremity edema  Edema has resolved. The renal function is normal and edema should be able to be controled with diuretics.     Screening for Esophageal varices   The last EGD to assess for varices was performed in 6/2021. Will schedule for EGD to assess for varices in 6/2024    Hepatic encephalopathy   Overt HE has not developed to date. There is no reason for treatment with lactulose or xifaxan. There is no need to restrict dietary protein at this time. Thrombocytopenia   This is secondary to cirrhosis. There is no evidence of overt bleeding. No treatment is required. The platelet count is adequate for the patient to undergo procedures without the need for platelet transfusion or platelet growth factors. Screening for Hepatocellular Carcinoma  HCC screening has recently been performed and does not suggest [facility] The next liver imaging study will be performed in 5/2022    Treatment of other medical problems in patients with chronic liver disease  There are no contraindications for the patient to take most medications that are necessary for treatment of other medical issues. Counseling for alcohol in patients with chronic liver disease  The patient was counseled regarding alcohol consumption and the effect of alcohol on chronic liver disease. The patient has not consumed alcohol since 8/2021    Osteoporosis  The risk of osteoporosis is increased in patients with cirrhosis. DEXA bone density to assess for osteoporosis has not been performed. This should be ordered by the patients primary care physician. Vaccinations   Vaccination for viral hepatitis A and B is not needed. The patient has serologic evidence of prior exposure or vaccination with immunity. Routine vaccinations against other bacterial and viral agents can be performed as indicated. Annual flu vaccination should be administered if indicated. The patient has received 2 doses of COVID-19 vaccine. ALLERGIES  No Known Allergies    MEDICATIONS  Current Outpatient Medications   Medication Sig    spironolactone (ALDACTONE) 50 mg tablet Take 1 Tablet by mouth daily.     folic acid (FOLVITE) 1 mg tablet Take  by mouth daily.  famotidine (PEPCID) 20 mg tablet Take 20 mg by mouth two (2) times a day.  ergocalciferol (Vitamin D2) 1,250 mcg (50,000 unit) capsule Take 50,000 Units by mouth.  potassium chloride SR (KLOR-CON 10) 10 mEq tablet Take  by mouth.  b complex-vitamin c-folic acid (NEPHROCAPS) 1 mg capsule Take 1 Cap by mouth daily. No current facility-administered medications for this visit. SYSTEM REVIEW NOT RELATED TO LIVER DISEASE OR REVIEWED ABOVE:  Constitution systems: Negative for fever, chills, weight gain, weight loss. Eyes: Negative for visual changes. ENT: Negative for sore throat, painful swallowing. Respiratory: Negative for cough, hemoptysis, SOB. Cardiology: Negative for chest pain, palpitations. GI:  Negative for constipation or diarrhea. : Negative for urinary frequency, dysuria, hematuria, nocturia. Skin: Negative for rash. Hematology: Negative for easy bruising, blood clots. Musculo-skelatal: Negative for back pain, muscle pain, weakness. Neurologic: Negative for headaches, dizziness, vertigo, memory problems not related to HE. Psychology: Negative for anxiety, depression. FAMILY HISTORY:  The father  of MI. The mother  of DM. There is no family history of liver disease. SOCIAL HISTORY:  The patient is . The patient has 1 child. The patient has never used tobacco products. The patient previously consumed 1-57 alcoholic beverages per day   The patient has been abstinent from alcohol since 2021   The patient used to work as a . The patient has not worked since 2021. PHYSICAL EXAMINATION:  Visit Vitals  BP (!) 140/76   Pulse (!) 140   Temp 98.3 °F (36.8 °C)   Resp (!) 35   Ht 5' 1\" (1.549 m)   Wt 96 lb (43.5 kg)   SpO2 98%   BMI 18.14 kg/m²     General: No acute distress. Eyes: Sclera anicteric. ENT: No oral lesions. Thyroid normal.  Nodes: No adenopathy. Skin: No spider angiomata.   No jaundice. No palmar erythema. Respiratory: Lungs clear to auscultation. Cardiovascular: Regular heart rate. No murmurs. No JVD. Abdomen: Soft non-tender. Liver size normal to percussion/palpation. Spleen not palpable. No obvious ascites. Umbilical hernia  Extremities: No edema. No muscle wasting. No gross arthritic changes. Neurologic: Alert and oriented. Cranial nerves grossly intact. No asterixis. LABORATORY STUDIES:  [facility] [address] & Units 10/28/2021 8/16/2021   WBC 3.4 - 10.8 x10E3/uL 5.4 7.4   ANC 1.4 - 7.0 x10E3/uL 3.3 5.5   HGB 11.1 - 15.9 g/dL 11.8 10.7 (L)    - 450 x10E3/uL 142 (L) 60 (L)   INR 0.9 - 1.2 1.1    AST 0 - 40 IU/L 38 228 (H)   ALT 0 - 32 IU/L 18 58   Alk Phos 44 - 121 IU/L 143 (H) 247 (H)   Bili, Total 0.0 - 1.2 mg/dL 1.3 (H) 4.7 (H)   Bili, Direct 0.00 - 0.40 mg/dL 0.69 (H)    Albumin 3.8 - 4.9 g/dL 2.1 (L) 2.8 (L)   BUN 6 - 24 mg/dL 8 3 (L)   Creat 0.57 - 1.00 mg/dL 0.73 0.42 (L)   Na 134 - 144 mmol/L 137 135 (L)   K 3.5 - 5.2 mmol/L 4.2 3.7   Cl 96 - 106 mmol/L 103 98   CO2 20 - 29 mmol/L 22 29   Glucose 65 - 99 mg/dL 55 (L) 94     From 9/2021  AST/ALT/ALP/T Bili/ALB: 109/33/124/4.9/2.4  WBC/HB/PLT/INR: 10.6/10.8/185/1.41  NA/BUN/CREAT: 129/9/0.63    Cancer Screening Latest Ref Rng & Units 5/5/2021   AFP, Serum 0.0 - 8.0 ng/mL 12.1 (H)   AFP-L3% 0.0 - 9.9 % Comment     SEROLOGIES:  Serologies Latest Ref Rng & Units 5/5/2021   Hep A Ab, Total Negative Positive (A)   Hep B Surface Ag Negative Negative   Hep B Core Ab, Total Negative Negative   Hep B Surface AB QL  Reactive   Hep C Ab 0.0 - 0.9 s/co ratio <0.1   Ferritin 15 - 150 ng/mL 128   Iron % Saturation 15 - 55 % 31   MULUGETA, IFA  Negative   ASMCA 0 - 19 Units 9   Ceruloplasmin 19.0 - 39.0 mg/dL 25.5   Alpha-1 antitrypsin level 101 - 187 mg/dL 198 (H)     LIVER HISTOLOGY:  Not available or performed    ENDOSCOPIC PROCEDURES:  Not available or performed    RADIOLOGY:  3/2021.  Ultrasound of liver. Echogenic consistent with cirrhosis. No liver mass lesions. No dilated bile ducts. Mild ascites. 11/2021. Ultrasound of liver. Echogenic consistent with cirrhosis. No liver mass lesions. No dilated bile ducts. No ascites. OTHER TESTING:  Not available or performed    FOLLOW-UP:  All of the issues listed above in the Assessment and Plan were discussed with the patient. All questions were answered. The patient expressed a clear understanding of the above. [address] [facility] in 4 weeks for monitoring. This will be 2 weeks following TIPS procedure.       ELLE Overton-BC  . Kecia Pedroza [facility] Liver [place] [address]  [place], [address] [place] [address] Street [address]  [phone]

## 2021-11-17 ENCOUNTER — TRANSCRIBE ORDER (OUTPATIENT)
Dept: INTERVENTIONAL RADIOLOGY/VASCULAR | Age: 56
End: 2021-11-17

## 2021-11-17 DIAGNOSIS — K70.31 ALCOHOLIC CIRRHOSIS OF LIVER WITH ASCITES (HCC): Primary | ICD-10-CM

## 2021-11-17 LAB
ALBUMIN SERPL-MCNC: 2.9 G/DL (ref 3.8–4.9)
ALP SERPL-CCNC: 145 IU/L (ref 44–121)
ALT SERPL-CCNC: 18 IU/L (ref 0–32)
AST SERPL-CCNC: 30 IU/L (ref 0–40)
BASOPHILS # BLD AUTO: 0.1 X10E3/UL (ref 0–0.2)
BASOPHILS NFR BLD AUTO: 1 %
BILIRUB DIRECT SERPL-MCNC: 0.53 MG/DL (ref 0–0.4)
BILIRUB SERPL-MCNC: 1.3 MG/DL (ref 0–1.2)
BUN SERPL-MCNC: 5 MG/DL (ref 6–24)
BUN/CREAT SERPL: 6 (ref 9–23)
CALCIUM SERPL-MCNC: 8.6 MG/DL (ref 8.7–10.2)
CHLORIDE SERPL-SCNC: 103 MMOL/L (ref 96–106)
CO2 SERPL-SCNC: 22 MMOL/L (ref 20–29)
CREAT SERPL-MCNC: 0.82 MG/DL (ref 0.57–1)
EOSINOPHIL # BLD AUTO: 0.1 X10E3/UL (ref 0–0.4)
EOSINOPHIL NFR BLD AUTO: 1 %
ERYTHROCYTE [DISTWIDTH] IN BLOOD BY AUTOMATED COUNT: 12.3 % (ref 11.7–15.4)
GLUCOSE SERPL-MCNC: ABNORMAL MG/DL
HCT VFR BLD AUTO: 33.7 % (ref 34–46.6)
HGB BLD-MCNC: 11.9 G/DL (ref 11.1–15.9)
IMM GRANULOCYTES # BLD AUTO: 0 X10E3/UL (ref 0–0.1)
IMM GRANULOCYTES NFR BLD AUTO: 0 %
INR PPP: 1.1 (ref 0.9–1.2)
LYMPHOCYTES # BLD AUTO: 1.2 X10E3/UL (ref 0.7–3.1)
LYMPHOCYTES NFR BLD AUTO: 18 %
MCH RBC QN AUTO: 34.5 PG (ref 26.6–33)
MCHC RBC AUTO-ENTMCNC: 35.3 G/DL (ref 31.5–35.7)
MCV RBC AUTO: 98 FL (ref 79–97)
MONOCYTES # BLD AUTO: 0.5 X10E3/UL (ref 0.1–0.9)
MONOCYTES NFR BLD AUTO: 8 %
NEUTROPHILS # BLD AUTO: 4.5 X10E3/UL (ref 1.4–7)
NEUTROPHILS NFR BLD AUTO: 72 %
PLATELET # BLD AUTO: 176 X10E3/UL (ref 150–450)
POTASSIUM SERPL-SCNC: ABNORMAL MMOL/L
PROT SERPL-MCNC: 7.7 G/DL (ref 6–8.5)
PROTHROMBIN TIME: 11.8 SEC (ref 9.1–12)
RBC # BLD AUTO: 3.45 X10E6/UL (ref 3.77–5.28)
SODIUM SERPL-SCNC: 141 MMOL/L (ref 134–144)
WBC # BLD AUTO: 6.3 X10E3/UL (ref 3.4–10.8)

## 2021-11-18 NOTE — PROGRESS NOTES
Liver function continues to improve. All other labs were either normal and/or abnormal values were not clinically meaningful to patient's current visit.

## 2021-11-22 ENCOUNTER — TRANSCRIBE ORDER (OUTPATIENT)
Dept: INTERVENTIONAL RADIOLOGY/VASCULAR | Age: 56
End: 2021-11-22

## 2021-11-22 ENCOUNTER — HOSPITAL ENCOUNTER (OUTPATIENT)
Dept: CT IMAGING | Age: 56
Discharge: HOME | End: 2021-11-22
Attending: INTERNAL MEDICINE
Payer: MEDICAID

## 2021-11-22 ENCOUNTER — HOSPITAL ENCOUNTER (OUTPATIENT)
Dept: INTERVENTIONAL RADIOLOGY/VASCULAR | Age: 56
Discharge: HOME | End: 2021-11-22
Attending: RADIOLOGY
Payer: MEDICAID

## 2021-11-22 DIAGNOSIS — K74.60 CIRRHOSIS (HCC): ICD-10-CM

## 2021-11-22 DIAGNOSIS — Z95.828 S/P TIPS (TRANSJUGULAR INTRAHEPATIC PORTOSYSTEMIC SHUNT): Primary | ICD-10-CM

## 2021-11-22 DIAGNOSIS — K70.31 ALCOHOLIC CIRRHOSIS OF LIVER WITH ASCITES (HCC): ICD-10-CM

## 2021-11-22 PROCEDURE — 74160 CT ABDOMEN W/CONTRAST: CPT

## 2021-11-22 PROCEDURE — 74011000636 HC RX REV CODE- 636: Performed by: INTERNAL MEDICINE

## 2021-11-22 RX ADMIN — IOPAMIDOL 100 ML: 612 INJECTION, SOLUTION INTRAVENOUS at 15:10

## 2021-11-22 NOTE — PROGRESS NOTES
Able to reach pt, pt will arrive at 1500 for TIPS consult, pt unable to arrive earlier r/t pt's daughter having an appointment this afternoon also. Pt also will have CT abd completed. Pt made aware of TIPS appointment on Dec 1, will provide further instructions during the consult today.

## 2021-11-22 NOTE — PROGRESS NOTES
Pt met with Dr. Rc Stallworth for TIPS consult, written appointment information provided regarding day of TIPS, post TIPS US, and when to complete COVID testing. NPO information also provided. Both pt and pt's daughter verbalized understanding.

## 2021-11-29 ENCOUNTER — HOSPITAL ENCOUNTER (OUTPATIENT)
Dept: PREADMISSION TESTING | Age: 56
Discharge: HOME | End: 2021-11-29

## 2021-11-29 VITALS — WEIGHT: 100 LBS | BODY MASS INDEX: 19.63 KG/M2 | HEIGHT: 60 IN

## 2021-11-29 RX ORDER — SODIUM CHLORIDE, SODIUM LACTATE, POTASSIUM CHLORIDE, CALCIUM CHLORIDE 600; 310; 30; 20 MG/100ML; MG/100ML; MG/100ML; MG/100ML
125 INJECTION, SOLUTION INTRAVENOUS CONTINUOUS
Status: CANCELLED | OUTPATIENT
Start: 2021-11-29

## 2021-11-29 NOTE — PERIOP NOTES
PAT phone interview completed. Patient made aware to be NPO. Patient stated she had two doses of COVID vaccine. Patient made aware to bring proof of COVID vaccination. Patient made aware not to wear jewelry, makeup, nail polish, lotion, oil or spray on DOS. Patient stated she has a ride for discharge and someone to stay with her for 24 hours after procedure. Patient stated she does not have a DNR order.

## 2021-11-30 ENCOUNTER — TRANSCRIBE ORDER (OUTPATIENT)
Dept: INTERVENTIONAL RADIOLOGY/VASCULAR | Age: 56
End: 2021-11-30

## 2021-11-30 DIAGNOSIS — R18.8 ASCITES: Primary | ICD-10-CM

## 2021-11-30 NOTE — PROGRESS NOTES
TIPS on hold at this time r/t hospital census, will proceed with paracentesis on 12/1/2021. Pt verbalizes understanding of 1000 arrival for 1100 procedure. Pt denies taking any antiplatelet/anticoagulant agents.

## 2021-11-30 NOTE — PERIOP NOTES
Potassium to be repeated when patient comes for paracentesis. IR department aware and will draw lab tomorrow. Patient notified of plan.

## 2021-12-01 ENCOUNTER — HOSPITAL ENCOUNTER (OUTPATIENT)
Dept: INTERVENTIONAL RADIOLOGY/VASCULAR | Age: 56
Discharge: HOME | End: 2021-12-01
Attending: NURSE PRACTITIONER
Payer: MEDICAID

## 2021-12-01 ENCOUNTER — HOSPITAL ENCOUNTER (OUTPATIENT)
Dept: ULTRASOUND IMAGING | Age: 56
End: 2021-12-01
Attending: INTERNAL MEDICINE
Payer: MEDICAID

## 2021-12-01 ENCOUNTER — HOSPITAL ENCOUNTER (OUTPATIENT)
Dept: ULTRASOUND IMAGING | Age: 56
Discharge: HOME | End: 2021-12-01
Attending: INTERNAL MEDICINE | Admitting: INTERNAL MEDICINE
Payer: MEDICAID

## 2021-12-01 VITALS
RESPIRATION RATE: 16 BRPM | TEMPERATURE: 98.4 F | SYSTOLIC BLOOD PRESSURE: 137 MMHG | OXYGEN SATURATION: 100 % | DIASTOLIC BLOOD PRESSURE: 80 MMHG | HEART RATE: 95 BPM | BODY MASS INDEX: 19.91 KG/M2 | HEIGHT: 60 IN | WEIGHT: 101.4 LBS

## 2021-12-01 DIAGNOSIS — R18.8 ASCITES: ICD-10-CM

## 2021-12-01 LAB — POTASSIUM SERPL-SCNC: 3.7 MMOL/L (ref 3.5–5.5)

## 2021-12-01 PROCEDURE — 84132 ASSAY OF SERUM POTASSIUM: CPT

## 2021-12-01 PROCEDURE — 76705 ECHO EXAM OF ABDOMEN: CPT

## 2021-12-01 RX ORDER — ALBUMIN HUMAN 250 G/1000ML
25 SOLUTION INTRAVENOUS
Status: DISCONTINUED | OUTPATIENT
Start: 2021-12-01 | End: 2021-12-01 | Stop reason: HOSPADM

## 2021-12-01 RX ORDER — LIDOCAINE HYDROCHLORIDE 10 MG/ML
10 INJECTION, SOLUTION EPIDURAL; INFILTRATION; INTRACAUDAL; PERINEURAL
Status: DISCONTINUED | OUTPATIENT
Start: 2021-12-01 | End: 2021-12-01 | Stop reason: HOSPADM

## 2021-12-01 NOTE — PROCEDURES
NTERVENTIONAL RADIOLOGY PROGRESS NOTE     Procedure(s) Requested:  US Guided Paracentesis    Performed By: Tonie Thompson PA-C     Supervising Radiologist: Rick Mcclain MD    Supervision: General     Pre-operative Diagnosis:  Ascites     Findings:  US images were taken in all abdominal quadrants. There was not enough ascites to safely perform the procedure. Paracentesis was not performed. Discussed with the patient. Patient states understanding and agrees with the plan.      Condition: Good     Disposition:  Home     Tonie Thompson [place]  Vascular & Interventional Radiology  [facility]  12/1/2021

## 2021-12-01 NOTE — PROGRESS NOTES
Pt prepped and ready for procedure. 1040 - to US for US guided Paracentesis. 1105 - Returned to care unit. Not enough ascites to perform the procedure. Paracentesis not done. 1115 - Patient armband removed and shredded. Discharged to home. Left ambulatory.

## 2022-01-04 NOTE — PROGRESS NOTES
TIPS procedure cancelled at this time r/t to hospital census, pt still needs paracentesis, instructed to arrive at 0730 for 0900 appointment, pt instructed that she is allowed to eat prior to paracentesis and that she will be discharged post procedure.  Writing RN will also call back at a later time with new date and time for TIPS

## 2022-01-05 ENCOUNTER — HOSPITAL ENCOUNTER (OUTPATIENT)
Dept: INTERVENTIONAL RADIOLOGY/VASCULAR | Age: 57
Discharge: HOME | End: 2022-01-05
Attending: NURSE PRACTITIONER

## 2022-01-05 ENCOUNTER — TRANSCRIBE ORDER (OUTPATIENT)
Dept: INTERVENTIONAL RADIOLOGY/VASCULAR | Age: 57
End: 2022-01-05

## 2022-01-05 DIAGNOSIS — R18.8 ASCITES: Primary | ICD-10-CM

## 2022-01-05 RX ORDER — ALBUMIN HUMAN 250 G/1000ML
25 SOLUTION INTRAVENOUS
Status: DISCONTINUED | OUTPATIENT
Start: 2022-01-07 | End: 2022-01-05

## 2022-01-05 NOTE — PROGRESS NOTES
Spoke to pt to schedule paracentesis for Friday 1/7. Pt instructed to arrive at 0900 for 1000 appointment. Pt will need labs drawn. Orders in.

## 2022-01-07 ENCOUNTER — HOSPITAL ENCOUNTER (OUTPATIENT)
Dept: ULTRASOUND IMAGING | Age: 57
Discharge: HOME | End: 2022-01-07
Attending: RADIOLOGY | Admitting: RADIOLOGY
Payer: MEDICAID

## 2022-01-07 VITALS
HEIGHT: 61 IN | SYSTOLIC BLOOD PRESSURE: 163 MMHG | DIASTOLIC BLOOD PRESSURE: 80 MMHG | OXYGEN SATURATION: 100 % | TEMPERATURE: 98.1 F | RESPIRATION RATE: 16 BRPM | WEIGHT: 114.2 LBS | BODY MASS INDEX: 21.56 KG/M2 | HEART RATE: 107 BPM

## 2022-01-07 DIAGNOSIS — R18.8 ASCITES: ICD-10-CM

## 2022-01-07 LAB
ERYTHROCYTE [DISTWIDTH] IN BLOOD BY AUTOMATED COUNT: 13.3 % (ref 11.6–14.5)
HCT VFR BLD AUTO: 33.7 % (ref 35–45)
HGB BLD-MCNC: 10.4 G/DL (ref 12–16)
INR PPP: 1.3 (ref 0.8–1.2)
MCH RBC QN AUTO: 30.3 PG (ref 24–34)
MCHC RBC AUTO-ENTMCNC: 30.9 G/DL (ref 31–37)
MCV RBC AUTO: 98.3 FL (ref 78–100)
NRBC # BLD: 0 K/UL (ref 0–0.01)
NRBC BLD-RTO: 0 PER 100 WBC
PLATELET # BLD AUTO: 154 K/UL (ref 135–420)
PMV BLD AUTO: 9.2 FL (ref 9.2–11.8)
PROTHROMBIN TIME: 15.2 SEC (ref 11.5–15.2)
RBC # BLD AUTO: 3.43 M/UL (ref 4.2–5.3)
WBC # BLD AUTO: 5.6 K/UL (ref 4.6–13.2)

## 2022-01-07 PROCEDURE — 85610 PROTHROMBIN TIME: CPT

## 2022-01-07 PROCEDURE — 76705 ECHO EXAM OF ABDOMEN: CPT

## 2022-01-07 PROCEDURE — 85027 COMPLETE CBC AUTOMATED: CPT

## 2022-01-07 PROCEDURE — 36415 COLL VENOUS BLD VENIPUNCTURE: CPT

## 2022-01-07 RX ORDER — ALBUMIN HUMAN 250 G/1000ML
25 SOLUTION INTRAVENOUS
Status: DISCONTINUED | OUTPATIENT
Start: 2022-01-07 | End: 2022-01-07 | Stop reason: HOSPADM

## 2022-01-07 RX ORDER — LIDOCAINE HYDROCHLORIDE 10 MG/ML
10 INJECTION, SOLUTION EPIDURAL; INFILTRATION; INTRACAUDAL; PERINEURAL AS NEEDED
Status: DISCONTINUED | OUTPATIENT
Start: 2022-01-07 | End: 2022-01-07 | Stop reason: HOSPADM

## 2022-01-07 NOTE — DISCHARGE INSTRUCTIONS
Patient Education     DISCHARGE SUMMARY from Nurse    PATIENT INSTRUCTIONS:    After general anesthesia or intravenous sedation, for 24 hours or while taking prescription Narcotics:  · Limit your activities  · Do not drive and operate hazardous machinery  · Do not make important personal or business decisions  · Do  not drink alcoholic beverages  · If you have not urinated within 8 hours after discharge, please contact your surgeon on call. Report the following to your surgeon:  · Excessive pain, swelling, redness or odor of or around the surgical area  · Temperature over 100.5  · Nausea and vomiting lasting longer than 4 hours or if unable to take medications  · Any signs of decreased circulation or nerve impairment to extremity: change in color, persistent  numbness, tingling, coldness or increase pain  · Any questions    What to do at Home:  Recommended activity: {discharge activity:29516}, ***    If you experience any of the following symptoms ***, please follow up with ***. *  Please give a list of your current medications to your Primary Care Provider. *  Please update this list whenever your medications are discontinued, doses are      changed, or new medications (including over-the-counter products) are added. *  Please carry medication information at all times in case of emergency situations. These are general instructions for a healthy lifestyle:    No smoking/ No tobacco products/ Avoid exposure to second hand smoke  Surgeon General's Warning:  Quitting smoking now greatly reduces serious risk to your health.     Obesity, smoking, and sedentary lifestyle greatly increases your risk for illness    A healthy diet, regular physical exercise & weight monitoring are important for maintaining a healthy lifestyle    You may be retaining fluid if you have a history of heart failure or if you experience any of the following symptoms:  Weight gain of 3 pounds or more overnight or 5 pounds in a week, increased swelling in our hands or feet or shortness of breath while lying flat in bed. Please call your doctor as soon as you notice any of these symptoms; do not wait until your next office visit. The discharge information has been reviewed with the {PATIENT PARENT GUARDIAN:11242}. The {PATIENT PARENT GUARDIAN:79779} verbalized understanding. Discharge medications reviewed with the {Dishcarge meds reviewed XEDO:39003} and appropriate educational materials and side effects teaching were provided. ___________________________________________________________________________________________________________________________________     Learning About Paracentesis  What is paracentesis? Paracentesis (say \"zucw-eg-nom-NORA-sherrill\") is a procedure that removes fluid from the belly. The buildup of fluid may be caused by infection, inflammation, an injury, or other problems. Swelling from too much fluid may cause pain or trouble breathing. The doctor will remove the extra fluid with a needle attached to a tube. Why is paracentesis done? Paracentesis may be done to:  · Find the cause of fluid buildup in the belly. · Diagnose an infection in the peritoneal fluid. · Check for certain types of cancer. · Remove a large amount of fluid that is causing pain or trouble breathing or that is affecting how the kidneys or the intestines (bowel) are working. · Check for damage after a belly injury. How is the procedure done? You will empty your bladder before the procedure. Your doctor or nurse will clean the area of your belly where the needle will go in. Then he or she will put sterile towels around the area. You may get a shot of numbing medicine in the skin of your belly. Then your doctor will gently insert a needle where the fluid is. He or she may attach a tube (catheter) to the site to help collect the fluid.   After the fluid has drained, your doctor will take out the needle or catheter and put a bandage on the site.  How long does a paracentesis take? The procedure may take from a few minutes to 30 minutes or more. What happens after the test?  You can do your normal activities after the procedure, unless your doctor tells you not to. After the procedure, you may have some clear fluid draining from the site, especially if a large amount of fluid was taken out. There will be less drainage in 1 to 2 days. Ask your doctor how much drainage to expect. A small gauze pad and bandage may be needed. You may need to change the bandage. If your doctor thinks that testing the fluid can help find the cause of a problem, he or she will send it to a lab. If fluid builds up in your belly again, your doctor may repeat this procedure. When should you call for help? Call 911 anytime you think you may need emergency care. For example, call if:  · You passed out (lost consciousness). Call your doctor now or seek immediate medical care if:  · You have symptoms of infection, such as:  ? Increased pain, swelling, warmth, or redness. ? Red streaks or pus. ? A fever. · You are dizzy or lightheaded, or you feel like you may faint. · You have new or worse belly pain. Watch closely for changes in your health, and be sure to contact your doctor if:  · Fluid builds up in your belly again. · You do not get better as expected. Where can you learn more? Go to http://www.gray.com/  Enter X447 in the search box to learn more about \"Learning About Paracentesis. \"  Current as of: February 10, 2021               Content Version: 13.0  © 3151-2159 [employer] [facility]. Care instructions adapted under license by [employer] (which disclaims liability or warranty for this information). If you have questions about a medical condition or this instruction, always ask your healthcare professional. [place] [address] any warranty or liability for your use of this information.   Patient {ARMBANDS:20124}

## 2022-01-07 NOTE — PROGRESS NOTES
Prepped & ready for procedure. 1020 No fluid, procedure not needed. IV removed. Pt discharged home ambulatory in stable condition in care of friend. Denies pain.

## 2022-01-19 ENCOUNTER — OFFICE VISIT (OUTPATIENT)
Dept: HEMATOLOGY | Age: 57
End: 2022-01-19
Payer: MEDICAID

## 2022-01-19 ENCOUNTER — HOSPITAL ENCOUNTER (OUTPATIENT)
Dept: LAB | Age: 57
Discharge: HOME | End: 2022-01-19

## 2022-01-19 VITALS
DIASTOLIC BLOOD PRESSURE: 79 MMHG | BODY MASS INDEX: 20.58 KG/M2 | HEIGHT: 61 IN | TEMPERATURE: 96.9 F | HEART RATE: 91 BPM | WEIGHT: 109 LBS | SYSTOLIC BLOOD PRESSURE: 137 MMHG | RESPIRATION RATE: 22 BRPM | OXYGEN SATURATION: 99 %

## 2022-01-19 DIAGNOSIS — K70.31 ALCOHOLIC CIRRHOSIS OF LIVER WITH ASCITES (HCC): Primary | ICD-10-CM

## 2022-01-19 LAB — XX-LABCORP SPECIMEN COL,LCBCF: NORMAL

## 2022-01-19 PROCEDURE — 99214 OFFICE O/P EST MOD 30 MIN: CPT | Performed by: NURSE PRACTITIONER

## 2022-01-19 PROCEDURE — 99001 SPECIMEN HANDLING PT-LAB: CPT

## 2022-01-20 LAB
ALBUMIN SERPL-MCNC: 3.6 G/DL (ref 3.8–4.9)
ALP SERPL-CCNC: 144 IU/L (ref 44–121)
ALT SERPL-CCNC: 16 IU/L (ref 0–32)
AST SERPL-CCNC: 35 IU/L (ref 0–40)
BASOPHILS # BLD AUTO: 0 X10E3/UL (ref 0–0.2)
BASOPHILS NFR BLD AUTO: 1 %
BILIRUB DIRECT SERPL-MCNC: 0.37 MG/DL (ref 0–0.4)
BILIRUB SERPL-MCNC: 0.9 MG/DL (ref 0–1.2)
BUN SERPL-MCNC: 4 MG/DL (ref 6–24)
BUN/CREAT SERPL: 6 (ref 9–23)
CALCIUM SERPL-MCNC: 9.4 MG/DL (ref 8.7–10.2)
CHLORIDE SERPL-SCNC: 99 MMOL/L (ref 96–106)
CO2 SERPL-SCNC: 22 MMOL/L (ref 20–29)
CREAT SERPL-MCNC: 0.7 MG/DL (ref 0.57–1)
EOSINOPHIL # BLD AUTO: 0.1 X10E3/UL (ref 0–0.4)
EOSINOPHIL NFR BLD AUTO: 2 %
ERYTHROCYTE [DISTWIDTH] IN BLOOD BY AUTOMATED COUNT: 13 % (ref 11.7–15.4)
GLUCOSE SERPL-MCNC: 97 MG/DL (ref 65–99)
HCT VFR BLD AUTO: 32.9 % (ref 34–46.6)
HGB BLD-MCNC: 11.2 G/DL (ref 11.1–15.9)
IMM GRANULOCYTES # BLD AUTO: 0 X10E3/UL (ref 0–0.1)
IMM GRANULOCYTES NFR BLD AUTO: 0 %
INR PPP: 1.1 (ref 0.9–1.2)
LYMPHOCYTES # BLD AUTO: 0.9 X10E3/UL (ref 0.7–3.1)
LYMPHOCYTES NFR BLD AUTO: 18 %
MCH RBC QN AUTO: 30.5 PG (ref 26.6–33)
MCHC RBC AUTO-ENTMCNC: 34 G/DL (ref 31.5–35.7)
MCV RBC AUTO: 90 FL (ref 79–97)
MONOCYTES # BLD AUTO: 0.5 X10E3/UL (ref 0.1–0.9)
MONOCYTES NFR BLD AUTO: 9 %
NEUTROPHILS # BLD AUTO: 3.4 X10E3/UL (ref 1.4–7)
NEUTROPHILS NFR BLD AUTO: 70 %
PLATELET # BLD AUTO: 171 X10E3/UL (ref 150–450)
POTASSIUM SERPL-SCNC: 4.1 MMOL/L (ref 3.5–5.2)
PROT SERPL-MCNC: 7.8 G/DL (ref 6–8.5)
PROTHROMBIN TIME: 11.8 SEC (ref 9.1–12)
RBC # BLD AUTO: 3.67 X10E6/UL (ref 3.77–5.28)
SODIUM SERPL-SCNC: 135 MMOL/L (ref 134–144)
WBC # BLD AUTO: 4.9 X10E3/UL (ref 3.4–10.8)

## 2022-01-20 NOTE — PROGRESS NOTES
Please let her know all her labs have continued to improve. Liver function is WNL. Keep up the great work!

## 2022-03-18 PROBLEM — K70.9 ALCOHOLIC LIVER DISEASE (HCC): Status: ACTIVE | Noted: 2021-05-14

## 2022-03-18 PROBLEM — K74.60 CIRRHOSIS (HCC): Status: ACTIVE | Noted: 2021-04-28

## 2022-03-18 PROBLEM — K43.9 VENTRAL HERNIA WITHOUT OBSTRUCTION OR GANGRENE: Status: ACTIVE | Noted: 2021-04-28

## 2022-03-19 PROBLEM — R18.8 ASCITES: Status: ACTIVE | Noted: 2021-05-14

## 2022-03-20 PROBLEM — F10.11 ALCOHOL ABUSE, IN REMISSION: Status: ACTIVE | Noted: 2021-04-28

## 2022-03-23 ENCOUNTER — OFFICE VISIT (OUTPATIENT)
Dept: HEMATOLOGY | Age: 57
End: 2022-03-23
Payer: MEDICAID

## 2022-03-23 ENCOUNTER — HOSPITAL ENCOUNTER (OUTPATIENT)
Dept: LAB | Age: 57
Discharge: HOME | End: 2022-03-23

## 2022-03-23 VITALS
OXYGEN SATURATION: 100 % | SYSTOLIC BLOOD PRESSURE: 123 MMHG | RESPIRATION RATE: 24 BRPM | TEMPERATURE: 96.3 F | HEIGHT: 61 IN | DIASTOLIC BLOOD PRESSURE: 62 MMHG | BODY MASS INDEX: 21.34 KG/M2 | HEART RATE: 96 BPM | WEIGHT: 113 LBS

## 2022-03-23 DIAGNOSIS — K70.31 ALCOHOLIC CIRRHOSIS OF LIVER WITH ASCITES (HCC): Primary | ICD-10-CM

## 2022-03-23 LAB — XX-LABCORP SPECIMEN COL,LCBCF: NORMAL

## 2022-03-23 PROCEDURE — 99214 OFFICE O/P EST MOD 30 MIN: CPT | Performed by: NURSE PRACTITIONER

## 2022-03-23 PROCEDURE — 99001 SPECIMEN HANDLING PT-LAB: CPT

## 2022-03-23 NOTE — PROGRESS NOTES
[facility], [place], [address], [place], [place]      Roselle Sandhoff, PA-C Marney Bucks, [facility]     Bhavana Delcid, [facility]   Shauna Olszewski, FNP-C Bonita Been, [facility]       Chicho Landry [facility] [address]    at [address], [address] [address]  22.    [phone]    FAX: [address] [place] [address], [address]    [phone]    FAX: [phone]     Patient Care Team:  Indra Niño MD as PCP - General (Internal Medicine)      Problem List  Date Reviewed: 1/19/2022          Codes Class Noted    Ascites ICD-10-CM: R18.8  ICD-9-CM: 789.59  2/13/3026        Al[facility] ([facility] ICD-10-CM: K70.9  ICD-9-CM: 571.3  5/14/2021        Ventral hernia without obstruction or gangrene ICD-10-CM: K43.9  ICD-9-CM: 553.20  4/28/2021        Cirrhosis ([facility] ICD-10-CM: K74.60  ICD-9-CM: 571.5  4/28/2021        Alcohol abuse, in remission ICD-10-CM: F10.11  ICD-9-CM: 305.03  4/28/2021              Amadeo Lang is being seen at [facility] for management of cirrhosis secondary to alcohol. The active problem list, all pertinent past medical history, medications, liver histology, endoscopic studies, radiologic findings, and laboratory findings related to the liver disorder were reviewed and discussed with the patient. The patient is a 60 y.o. female who was found to have chronic liver disease and cirrhosis in 3/2021 when she underwent a liver ultrasound. Serologic evaluation for markers of chronic liver disease were negative.     The patient has developed the following complications of cirrhosis: ascites    The patient has the following symptoms which could be attributed to the liver disorder: swelling of the abdomen which has resolved with low dose diuretics. The patient is not experiencing the following symptoms which are commonly seen in this liver disorder: fatigue, pain in the right side over the liver    The patient completes all daily activities without any functional limitations. ASSESSMENT AND PLAN:  Cirrhosis  The diagnosis of cirrhosis is based upon imaging, laboratory studies, complications of cirrhosis. Cirrhosis is suspected secondary to alcohol based upon a history of consuming significant alcohol on a daily basis for many years, pattern of AST>ALT, serology that is negative for other causes of chronic liver disease. The patient has near normal liver function. The CTP is 6. Child class A. The MELD score is 11. Will perform laboratory testing to monitor liver function and degree of liver injury. This included BMP, hepatic panel, CBC with platelet count, INR. Liver transaminases are normal. ALP is elevated. Liver function is normal. Total bilirubin is elevated. The platelet count is depressed. The patient had consumed 9-31 alcoholic beverages daily. The patient has been abstinent from alcohol since 8/2021. Discussed the need to remain alcohol free. Ascites   Ascites has resolved with current dose of diuretics and paracentesis. Paracentesis was last performed in 11/2021. Will continue the current dose of diuretics. The patient is only on 50 mg spironolactone    The renal function is normal and ascites should be able to be controled with diuretics. The patient was counseled regarding the need to maintain sodium restriction and the types of foods containing high amounts of sodium to be avoided. ECHO performed 10/2021 was normal.    The patient was scheduled for TIPS procedure however her hyponatremia has resolved and ascites is being controlled with minimal diuretics. TIPS is not indicated at this time. Lower extremity edema  Edema has resolved.     Screening for Esophageal varices   The last EGD to assess for varices was performed in 6/2021. Next EGD to assess for varices is due in 6/2024    Hepatic encephalopathy   Overt HE has not developed to date. There is no reason for treatment with lactulose or xifaxan. There is no need to restrict dietary protein at this time. Thrombocytopenia   This is secondary to cirrhosis. There is no evidence of overt bleeding. This has resolved and platelet count has been normal since 11/2021. Screening for Hepatocellular Carcinoma  HCC screening has recently been performed and does not suggest [address] The next liver imaging study will be performed in 5/2022  AFP ordered today. Ultrasound will be scheduled. Treatment of other medical problems in patients with chronic liver disease  There are no contraindications for the patient to take most medications that are necessary for treatment of other medical issues. Counseling for alcohol in patients with chronic liver disease  The patient was counseled regarding alcohol consumption and the effect of alcohol on chronic liver disease. The patient has not consumed alcohol since 8/2021    Osteoporosis  The risk of osteoporosis is increased in patients with cirrhosis. DEXA bone density to assess for osteoporosis has not been performed. This should be ordered by the patients primary care physician. Vaccinations   Vaccination for viral hepatitis A and B is not needed. The patient has serologic evidence of prior exposure or vaccination with immunity. Routine vaccinations against other bacterial and viral agents can be performed as indicated. Annual flu vaccination should be administered if indicated. The patient has received 2 doses of COVID-19 vaccine and the booster. ALLERGIES  No Known Allergies    MEDICATIONS  Current Outpatient Medications   Medication Sig    spironolactone (ALDACTONE) 50 mg tablet Take 1 Tablet by mouth daily.     folic acid (FOLVITE) 1 mg tablet Take  by mouth daily.  famotidine (PEPCID) 20 mg tablet Take 20 mg by mouth two (2) times a day.  ergocalciferol (Vitamin D2) 1,250 mcg (50,000 unit) capsule Take 50,000 Units by mouth every seven (7) days.  potassium chloride SR (KLOR-CON 10) 10 mEq tablet Take  by mouth. No current facility-administered medications for this visit. SYSTEM REVIEW NOT RELATED TO LIVER DISEASE OR REVIEWED ABOVE:  Constitution systems: Negative for fever, chills, weight gain, weight loss. Eyes: Negative for visual changes. ENT: Negative for sore throat, painful swallowing. Respiratory: Negative for cough, hemoptysis, SOB. Cardiology: Negative for chest pain, palpitations. GI:  Negative for constipation or diarrhea. : Negative for urinary frequency, dysuria, hematuria, nocturia. Skin: Negative for rash. Hematology: Negative for easy bruising, blood clots. Musculo-skelatal: Negative for back pain, muscle pain, weakness. Neurologic: Negative for headaches, dizziness, vertigo, memory problems not related to HE. Psychology: Negative for anxiety, depression. FAMILY HISTORY:  The father  of MI. The mother  of DM. There is no family history of liver disease. SOCIAL HISTORY:  The patient is . The patient has 1 child. The patient has never used tobacco products. The patient previously consumed 8-55 alcoholic beverages per day   The patient has been abstinent from alcohol since 2021   The patient used to work as a . The patient has not worked since 2021. PHYSICAL EXAMINATION:  Visit Vitals  /62   Pulse 96   Temp (!) 96.3 °F (35.7 °C)   Resp 24   Ht 5' 1\" (1.549 m)   Wt 113 lb (51.3 kg)   SpO2 100%   BMI 21.35 kg/m²     General: No acute distress. Eyes: Sclera anicteric. ENT: No oral lesions. Thyroid normal.  Nodes: No adenopathy. Skin: No spider angiomata. No jaundice. No palmar erythema.   Respiratory: Lungs clear to auscultation. Cardiovascular: Regular heart rate. No murmurs. No JVD. Abdomen: Soft non-tender. Liver size normal to percussion/palpation. Spleen not palpable. No obvious ascites. Umbilical hernia  Extremities: No edema. No muscle wasting. No gross arthritic changes. Neurologic: Alert and oriented. Cranial nerves grossly intact. No asterixis. LABORATORY STUDIES:  Liver [place] of [address] & Units 1/19/2022   WBC 3.4 - 10.8 x10E3/uL 4.9   ANC 1.4 - 7.0 x10E3/uL 3.4   HGB 11.1 - 15.9 g/dL 11.2    - 450 x10E3/uL 171   INR 0.9 - 1.2 1.1   AST 0 - 40 IU/L 35   ALT 0 - 32 IU/L 16   Alk Phos 44 - 121 IU/L 144 (H)   Bili, Total 0.0 - 1.2 mg/dL 0.9   Bili, Direct 0.00 - 0.40 mg/dL 0.37   Albumin 3.8 - 4.9 g/dL 3.6 (L)   BUN 6 - 24 mg/dL 4 (L)   Creat 0.57 - 1.00 mg/dL 0.70   Na 134 - 144 mmol/L 135   K 3.5 - 5.2 mmol/L 4.1   Cl 96 - 106 mmol/L 99   CO2 20 - 29 mmol/L 22   Glucose 65 - 99 mg/dL 97     Cancer Screening Latest Ref Rng & Units 5/5/2021   AFP, Serum 0.0 - 8.0 ng/mL 12.1 (H)   AFP-L3% 0.0 - 9.9 % Comment     SEROLOGIES:  Serologies Latest Ref Rng & Units 5/5/2021   Hep A Ab, Total Negative Positive (A)   Hep B Surface Ag Negative Negative   Hep B Core Ab, Total Negative Negative   Hep B Surface AB QL  Reactive   Hep C Ab 0.0 - 0.9 s/co ratio <0.1   Ferritin 15 - 150 ng/mL 128   Iron % Saturation 15 - 55 % 31   MULUGETA, IFA  Negative   ASMCA 0 - 19 Units 9   Ceruloplasmin 19.0 - 39.0 mg/dL 25.5   Alpha-1 antitrypsin level 101 - 187 mg/dL 198 (H)     LIVER HISTOLOGY:  Not available or performed    ENDOSCOPIC PROCEDURES:  Not available or performed    RADIOLOGY:  3/2021. Ultrasound of liver. Echogenic consistent with cirrhosis. No liver mass lesions. No dilated bile ducts. Mild ascites. 11/2021. Ultrasound of liver. Echogenic consistent with cirrhosis. No liver mass lesions. No dilated bile ducts. No ascites.     OTHER TESTING:  Not available or performed    FOLLOW-UP:  All of the issues listed above in the Assessment and Plan were discussed with the patient. All questions were answered. The patient expressed a clear understanding of the above. [facility] in 2 months for routine monitoring and Fibroscan. The patient will have ultrasound same day as next visit.        ELLE Peterson-BC  [address] Liver [place] of [address]  [phone]

## 2022-03-25 LAB
AFP L3 MFR SERPL: NORMAL % (ref 0–9.9)
AFP SERPL-MCNC: 6.2 NG/ML (ref 0–9.2)
ALBUMIN SERPL-MCNC: 3.8 G/DL (ref 3.8–4.9)
ALP SERPL-CCNC: 116 IU/L (ref 44–121)
ALT SERPL-CCNC: 14 IU/L (ref 0–32)
AST SERPL-CCNC: 25 IU/L (ref 0–40)
BASOPHILS # BLD AUTO: 0 X10E3/UL (ref 0–0.2)
BASOPHILS NFR BLD AUTO: 1 %
BILIRUB DIRECT SERPL-MCNC: 0.18 MG/DL (ref 0–0.4)
BILIRUB SERPL-MCNC: 0.4 MG/DL (ref 0–1.2)
BUN SERPL-MCNC: 10 MG/DL (ref 6–24)
BUN/CREAT SERPL: 16 (ref 9–23)
CALCIUM SERPL-MCNC: 9.6 MG/DL (ref 8.7–10.2)
CHLORIDE SERPL-SCNC: 102 MMOL/L (ref 96–106)
CO2 SERPL-SCNC: 18 MMOL/L (ref 20–29)
CREAT SERPL-MCNC: 0.64 MG/DL (ref 0.57–1)
EGFR: 102 ML/MIN/1.73
EOSINOPHIL # BLD AUTO: 0.2 X10E3/UL (ref 0–0.4)
EOSINOPHIL NFR BLD AUTO: 3 %
ERYTHROCYTE [DISTWIDTH] IN BLOOD BY AUTOMATED COUNT: 15.7 % (ref 11.7–15.4)
GLUCOSE SERPL-MCNC: 82 MG/DL (ref 65–99)
HCT VFR BLD AUTO: 32.7 % (ref 34–46.6)
HGB BLD-MCNC: 10.8 G/DL (ref 11.1–15.9)
IMM GRANULOCYTES # BLD AUTO: 0 X10E3/UL (ref 0–0.1)
IMM GRANULOCYTES NFR BLD AUTO: 0 %
INR PPP: 1.1 (ref 0.9–1.2)
LYMPHOCYTES # BLD AUTO: 1.3 X10E3/UL (ref 0.7–3.1)
LYMPHOCYTES NFR BLD AUTO: 20 %
MCH RBC QN AUTO: 28.9 PG (ref 26.6–33)
MCHC RBC AUTO-ENTMCNC: 33 G/DL (ref 31.5–35.7)
MCV RBC AUTO: 87 FL (ref 79–97)
MONOCYTES # BLD AUTO: 0.5 X10E3/UL (ref 0.1–0.9)
MONOCYTES NFR BLD AUTO: 7 %
NEUTROPHILS # BLD AUTO: 4.5 X10E3/UL (ref 1.4–7)
NEUTROPHILS NFR BLD AUTO: 69 %
PLATELET # BLD AUTO: 170 X10E3/UL (ref 150–450)
POTASSIUM SERPL-SCNC: 4.2 MMOL/L (ref 3.5–5.2)
PROT SERPL-MCNC: 6.9 G/DL (ref 6–8.5)
PROTHROMBIN TIME: 11.4 SEC (ref 9.1–12)
RBC # BLD AUTO: 3.74 X10E6/UL (ref 3.77–5.28)
SODIUM SERPL-SCNC: 137 MMOL/L (ref 134–144)
WBC # BLD AUTO: 6.4 X10E3/UL (ref 3.4–10.8)

## 2022-04-05 ENCOUNTER — HOSPITAL ENCOUNTER (OUTPATIENT)
Dept: ULTRASOUND IMAGING | Age: 57
Discharge: HOME | End: 2022-04-05
Attending: NURSE PRACTITIONER
Payer: MEDICAID

## 2022-04-05 DIAGNOSIS — K70.31 ALCOHOLIC CIRRHOSIS OF LIVER WITH ASCITES (HCC): ICD-10-CM

## 2022-04-05 PROCEDURE — 76705 ECHO EXAM OF ABDOMEN: CPT

## 2022-04-06 RX ORDER — SPIRONOLACTONE 50 MG/1
TABLET, FILM COATED ORAL
Qty: 30 TABLET | Refills: 3 | Status: SHIPPED | OUTPATIENT
Start: 2022-04-06 | End: 2022-07-19

## 2022-07-19 RX ORDER — SPIRONOLACTONE 50 MG/1
TABLET, FILM COATED ORAL
Qty: 30 TABLET | Refills: 3 | Status: SHIPPED | OUTPATIENT
Start: 2022-07-19 | End: 2022-10-11

## 2022-09-15 ENCOUNTER — OFFICE VISIT (OUTPATIENT)
Dept: HEMATOLOGY | Age: 57
End: 2022-09-15
Payer: MEDICAID

## 2022-09-15 ENCOUNTER — HOSPITAL ENCOUNTER (OUTPATIENT)
Dept: LAB | Age: 57
Discharge: HOME | End: 2022-09-15

## 2022-09-15 VITALS
SYSTOLIC BLOOD PRESSURE: 141 MMHG | TEMPERATURE: 96.4 F | HEART RATE: 80 BPM | OXYGEN SATURATION: 99 % | DIASTOLIC BLOOD PRESSURE: 77 MMHG | WEIGHT: 118.38 LBS | BODY MASS INDEX: 22.37 KG/M2

## 2022-09-15 DIAGNOSIS — K70.31 ALCOHOLIC CIRRHOSIS OF LIVER WITH ASCITES (HCC): Primary | ICD-10-CM

## 2022-09-15 LAB — XX-LABCORP SPECIMEN COL,LCBCF: NORMAL

## 2022-09-15 PROCEDURE — 91200 LIVER ELASTOGRAPHY: CPT | Performed by: NURSE PRACTITIONER

## 2022-09-15 PROCEDURE — 99215 OFFICE O/P EST HI 40 MIN: CPT | Performed by: NURSE PRACTITIONER

## 2022-09-15 PROCEDURE — 99001 SPECIMEN HANDLING PT-LAB: CPT

## 2022-09-15 NOTE — PROGRESS NOTES
3340 [facility], MD, [address], [place], [place]      YAZAN [facility]-CADY Delcid [facility]-BC   Spero Genta, FNP-C Luwanna Closs, [facility]       Chicho Landry Shady De [address] 136    at [address], [address] Luigi Salcido  22.    [phone]    FAX: [address] [place] [address]    at [address], [address]    [phone]    FAX: [phone]     Patient Care Team:  Isabelle Chaudhary MD as PCP - General (Internal Medicine Physician)      Problem List  Date Reviewed: 9/15/2022            Codes Class Noted    Ascites ICD-10-CM: R18.8  ICD-9-CM: 789.59  0/48/4832        Alcoholic liver disease ([facility].) ICD-10-CM: K70.9  ICD-9-CM: 571.3  5/14/2021        Ventral hernia without obstruction or gangrene ICD-10-CM: K43.9  ICD-9-CM: 553.20  4/28/2021        Cirrhosis ([facility] ICD-10-CM: K74.60  ICD-9-CM: 571.5  4/28/2021        Alcohol abuse, in remission ICD-10-CM: F10.11  ICD-9-CM: 305.03  4/28/2021           Dante Flores is being seen at [address] for management of cirrhosis secondary to alcohol. The active problem list, all pertinent past medical history, medications, liver histology, endoscopic studies, radiologic findings, and laboratory findings related to the liver disorder were reviewed and discussed with the patient. The patient is a 60 y.o. female who was found to have chronic liver disease and cirrhosis in 3/2021 when she underwent a liver ultrasound. Serologic evaluation for markers of chronic liver disease were negative. The most recent imaging of the liver was Ultrasound performed in 4/2022. Results suggest cirrhosis. No liver mass lesions noted.     Assessment of liver fibrosis with Fibroscan was performed in the office today. The result was 34.6 kPa which correlates with cirrhosis. The CAP score of 254 suggests there is no significant hepatic steatosis. The patient has developed the following complications of cirrhosis: ascites    The patient has the following symptoms which could be attributed to the liver disorder: swelling of the abdomen which has resolved with low dose diuretics. The patient is not experiencing the following symptoms which are commonly seen in this liver disorder: fatigue, pain in the right side over the liver    The patient completes all daily activities without any functional limitations. ASSESSMENT AND PLAN:  Cirrhosis  The diagnosis of cirrhosis is based upon imaging, laboratory studies, complications of cirrhosis. Cirrhosis is suspected secondary to alcohol based upon a history of consuming significant alcohol on a daily basis for many years, pattern of AST>ALT, serology that is negative for other causes of chronic liver disease. The patient has near normal liver function. The CTP is 6. Child class A. The MELD score is 11. Will perform laboratory testing to monitor liver function and degree of liver injury. This included BMP, hepatic panel, CBC with platelet count, INR. Liver transaminases are normal. ALP is normal. Liver function is normal. Total bilirubin is elevated. The platelet count is depressed. The patient had consumed 2-46 alcoholic beverages daily. The patient has been abstinent from alcohol since 8/2021. Discussed the need to remain alcohol free. Ascites   Ascites has resolved with current dose of diuretics and paracentesis. Paracentesis was last performed in 11/2021. Will continue the current dose of diuretics. The patient is only on 50 mg spironolactone    The renal function is normal and ascites should be able to be controled with diuretics.     The patient was counseled regarding the need to maintain sodium restriction and the types of foods containing high amounts of sodium to be avoided. ECHO performed 10/2021 was normal.    The patient was scheduled for TIPS procedure however her hyponatremia has resolved and ascites is being controlled with minimal diuretics. TIPS is not indicated at this time. Lower extremity edema  Edema has resolved. Screening for Esophageal varices   The last EGD to assess for varices was performed in 6/2021. Next EGD to assess for varices is due in 6/2024    Hepatic encephalopathy   Overt HE has not developed to date. There is no reason for treatment with lactulose or xifaxan. There is no need to restrict dietary protein at this time. Thrombocytopenia   This is secondary to cirrhosis. There is no evidence of overt bleeding. This has resolved and platelet count has been normal since 11/2021. Screening for Hepatocellular Carcinoma  HCC screening has recently been performed and does not suggest [facility].. The next liver imaging study will be performed in 2/2023  AFP ordered today. Ultrasound will be scheduled. Treatment of other medical problems in patients with chronic liver disease  There are no contraindications for the patient to take most medications that are necessary for treatment of other medical issues. Counseling for alcohol in patients with chronic liver disease  The patient was counseled regarding alcohol consumption and the effect of alcohol on chronic liver disease. The patient has not consumed alcohol since 8/2021    Osteoporosis  The risk of osteoporosis is increased in patients with cirrhosis. DEXA bone density to assess for osteoporosis has not been performed. This should be ordered by the patients primary care physician. Vaccinations   Vaccination for viral hepatitis A and B is not needed. The patient has serologic evidence of prior exposure or vaccination with immunity.   Routine vaccinations against other bacterial and viral agents can be performed as indicated. Annual flu vaccination should be administered if indicated. The patient has received 2 doses of COVID-19 vaccine and the booster. ALLERGIES  No Known Allergies    MEDICATIONS  Current Outpatient Medications   Medication Sig    spironolactone (ALDACTONE) 50 mg tablet TAKE 1 TABLET BY MOUTH EVERY DAY    folic acid (FOLVITE) 1 mg tablet Take  by mouth daily. famotidine (PEPCID) 20 mg tablet Take 20 mg by mouth two (2) times a day. ergocalciferol (ERGOCALCIFEROL) 1,250 mcg (50,000 unit) capsule Take 50,000 Units by mouth every seven (7) days. potassium chloride SR (KLOR-CON 10) 10 mEq tablet Take  by mouth. No current facility-administered medications for this visit. SYSTEM REVIEW NOT RELATED TO LIVER DISEASE OR REVIEWED ABOVE:  Constitution systems: Negative for fever, chills, weight gain, weight loss. Eyes: Negative for visual changes. ENT: Negative for sore throat, painful swallowing. Respiratory: Negative for cough, hemoptysis, SOB. Cardiology: Negative for chest pain, palpitations. GI:  Negative for constipation or diarrhea. : Negative for urinary frequency, dysuria, hematuria, nocturia. Skin: Negative for rash. Hematology: Negative for easy bruising, blood clots. Musculo-skelatal: Negative for back pain, muscle pain, weakness. Neurologic: Negative for headaches, dizziness, vertigo, memory problems not related to HE. Psychology: Negative for anxiety, depression. FAMILY HISTORY:  The father  of MI. The mother  of DM. There is no family history of liver disease. SOCIAL HISTORY:  The patient is . The patient has 1 child. The patient has never used tobacco products. The patient previously consumed 9-28 alcoholic beverages per day   The patient has been abstinent from alcohol since 2021   The patient used to work as a . The patient has not worked since 2021.       PHYSICAL EXAMINATION:  Visit Vitals  BP (!) 141/77   Pulse 80   Temp (!) 96.4 °F (35.8 °C) (Tympanic)   Wt 118 lb 6 oz (53.7 kg)   SpO2 99%   BMI 22.37 kg/m²     General: No acute distress. Eyes: Sclera anicteric. ENT: No oral lesions. Thyroid normal.  Nodes: No adenopathy. Skin: No spider angiomata. No jaundice. No palmar erythema. Respiratory: Lungs clear to auscultation. Cardiovascular: Regular heart rate. No murmurs. No JVD. Abdomen: Soft non-tender. Liver size normal to percussion/palpation. Spleen not palpable. No obvious ascites. Umbilical hernia  Extremities: No edema. No muscle wasting. No gross arthritic changes. Neurologic: Alert and oriented. Cranial nerves grossly intact. No asterixis.     LABORATORY STUDIES:  Liver [place] [address]  [address] & Units 3/23/2022 1/19/2022   WBC 3.4 - 10.8 x10E3/uL 6.4 4.9   ANC 1.4 - 7.0 x10E3/uL 4.5 3.4   HGB 11.1 - 15.9 g/dL 10.8 (L) 11.2    - 450 x10E3/uL 170 171   INR 0.9 - 1.2 1.1 1.1   AST 0 - 40 IU/L 25 35   ALT 0 - 32 IU/L 14 16   Alk Phos 44 - 121 IU/L 116 144 (H)   Bili, Total 0.0 - 1.2 mg/dL 0.4 0.9   Bili, Direct 0.00 - 0.40 mg/dL 0.18 0.37   Albumin 3.8 - 4.9 g/dL 3.8 3.6 (L)   BUN 6 - 24 mg/dL 10 4 (L)   Creat 0.57 - 1.00 mg/dL 0.64 0.70   Na 134 - 144 mmol/L 137 135   K 3.5 - 5.2 mmol/L 4.2 4.1   Cl 96 - 106 mmol/L 102 99   CO2 20 - 29 mmol/L 18 (L) 22   Glucose 65 - 99 mg/dL 82 97     Cancer Screening Latest Ref Rng & Units 3/23/2022 5/5/2021   AFP, Serum 0.0 - 9.2 ng/mL 6.2 12.1 (H)   AFP-L3% 0.0 - 9.9 % Comment Comment     SEROLOGIES:  Serologies Latest Ref Rng & Units 5/5/2021   Hep A Ab, Total Negative Positive (A)   Hep B Surface Ag Negative Negative   Hep B Core Ab, Total Negative Negative   Hep B Surface AB QL  Reactive   Hep C Ab 0.0 - 0.9 s/co ratio <0.1   Ferritin 15 - 150 ng/mL 128   Iron % Saturation 15 - 55 % 31   MULUGETA, IFA  Negative   ASMCA 0 - 19 Units 9   Ceruloplasmin 19.0 - 39.0 mg/dL 25.5   Alpha-1 antitrypsin level 101 - 187 mg/dL 198 (H)     LIVER HISTOLOGY:  9/2022. [facility]. EkPa was 34.6. IQR/med 7%. . The results suggested a fibrosis level of F4. The CAP score suggests there is no significant hepatic steatosis. ENDOSCOPIC PROCEDURES:  6/2021. EGD performed by MLS. No esophageal varices. No gastric varices. Mild portal gastropathy. Gastritis. Biopsy for HPylori was negative. RADIOLOGY:  3/2021. Ultrasound of liver. Echogenic consistent with cirrhosis. No liver mass lesions. No dilated bile ducts. Mild ascites. 11/2021. Ultrasound of liver. Echogenic consistent with cirrhosis. No liver mass lesions. No dilated bile ducts. No ascites. 4/2022. Ultrasound of liver. Echogenic consistent with cirrhosis. No liver mass lesions. No dilated bile ducts. No ascites. 8/2022. CT scan abdomen with IV contrast.  Changes consistent with cirrhosis. No liver mass lesions. No dilated bile ducts. No ascites. OTHER TESTING:  Not available or performed    FOLLOW-UP:  All of the issues listed above in the Assessment and Plan were discussed with the patient. All questions were answered. The patient expressed a clear understanding of the above. [address] [address] in 6 months for routine monitoring.       Ranjith Morrell, AGPCNP-BC  [address] Liver [place] [address]  [phone]

## 2022-09-17 LAB
AFP L3 MFR SERPL: NORMAL % (ref 0–9.9)
AFP SERPL-MCNC: 6.7 NG/ML (ref 0–9.2)
ALBUMIN SERPL-MCNC: 4.6 G/DL (ref 3.8–4.9)
ALP SERPL-CCNC: 146 IU/L (ref 44–121)
ALT SERPL-CCNC: 19 IU/L (ref 0–32)
AST SERPL-CCNC: 18 IU/L (ref 0–40)
BASOPHILS # BLD AUTO: 0 X10E3/UL (ref 0–0.2)
BASOPHILS NFR BLD AUTO: 0 %
BILIRUB DIRECT SERPL-MCNC: 0.22 MG/DL (ref 0–0.4)
BILIRUB SERPL-MCNC: 0.7 MG/DL (ref 0–1.2)
BUN SERPL-MCNC: 2 MG/DL (ref 6–24)
BUN/CREAT SERPL: 3 (ref 9–23)
CALCIUM SERPL-MCNC: 9.5 MG/DL (ref 8.7–10.2)
CHLORIDE SERPL-SCNC: 97 MMOL/L (ref 96–106)
CO2 SERPL-SCNC: 20 MMOL/L (ref 20–29)
CREAT SERPL-MCNC: 0.76 MG/DL (ref 0.57–1)
EGFR: 91 ML/MIN/1.73
EOSINOPHIL # BLD AUTO: 0 X10E3/UL (ref 0–0.4)
EOSINOPHIL NFR BLD AUTO: 1 %
ERYTHROCYTE [DISTWIDTH] IN BLOOD BY AUTOMATED COUNT: 12.4 % (ref 11.7–15.4)
FERRITIN SERPL-MCNC: 185 NG/ML (ref 15–150)
GLUCOSE SERPL-MCNC: 99 MG/DL (ref 65–99)
HCT VFR BLD AUTO: 35 % (ref 34–46.6)
HGB BLD-MCNC: 12.4 G/DL (ref 11.1–15.9)
IMM GRANULOCYTES # BLD AUTO: 0 X10E3/UL (ref 0–0.1)
IMM GRANULOCYTES NFR BLD AUTO: 0 %
INR PPP: 1.1 (ref 0.9–1.2)
IRON SATN MFR SERPL: 29 % (ref 15–55)
IRON SERPL-MCNC: 120 UG/DL (ref 27–159)
LYMPHOCYTES # BLD AUTO: 0.9 X10E3/UL (ref 0.7–3.1)
LYMPHOCYTES NFR BLD AUTO: 19 %
MCH RBC QN AUTO: 32.5 PG (ref 26.6–33)
MCHC RBC AUTO-ENTMCNC: 35.4 G/DL (ref 31.5–35.7)
MCV RBC AUTO: 92 FL (ref 79–97)
MONOCYTES # BLD AUTO: 0.3 X10E3/UL (ref 0.1–0.9)
MONOCYTES NFR BLD AUTO: 6 %
NEUTROPHILS # BLD AUTO: 3.4 X10E3/UL (ref 1.4–7)
NEUTROPHILS NFR BLD AUTO: 74 %
PLATELET # BLD AUTO: 132 X10E3/UL (ref 150–450)
POTASSIUM SERPL-SCNC: 3.9 MMOL/L (ref 3.5–5.2)
PROT SERPL-MCNC: 7.3 G/DL (ref 6–8.5)
PROTHROMBIN TIME: 11.3 SEC (ref 9.1–12)
RBC # BLD AUTO: 3.81 X10E6/UL (ref 3.77–5.28)
SODIUM SERPL-SCNC: 134 MMOL/L (ref 134–144)
TIBC SERPL-MCNC: 419 UG/DL (ref 250–450)
UIBC SERPL-MCNC: 299 UG/DL (ref 131–425)
WBC # BLD AUTO: 4.7 X10E3/UL (ref 3.4–10.8)

## 2022-10-11 RX ORDER — SPIRONOLACTONE 50 MG/1
TABLET, FILM COATED ORAL
Qty: 30 TABLET | Refills: 3 | Status: SHIPPED | OUTPATIENT
Start: 2022-10-11

## 2023-03-15 ENCOUNTER — HOSPITAL ENCOUNTER (OUTPATIENT)
Facility: HOSPITAL | Age: 58
Setting detail: SPECIMEN
Discharge: HOME | End: 2023-03-18

## 2023-03-15 ENCOUNTER — OFFICE VISIT (OUTPATIENT)
Age: 58
End: 2023-03-15
Payer: MEDICAID

## 2023-03-15 ENCOUNTER — HOSPITAL ENCOUNTER (OUTPATIENT)
Facility: HOSPITAL | Age: 58
Discharge: HOME | End: 2023-03-18
Payer: MEDICAID

## 2023-03-15 VITALS
DIASTOLIC BLOOD PRESSURE: 69 MMHG | OXYGEN SATURATION: 98 % | WEIGHT: 116 LBS | HEART RATE: 108 BPM | SYSTOLIC BLOOD PRESSURE: 145 MMHG | BODY MASS INDEX: 21.9 KG/M2 | HEIGHT: 61 IN | TEMPERATURE: 96.8 F

## 2023-03-15 DIAGNOSIS — K70.31 ALCOHOLIC CIRRHOSIS OF LIVER WITH ASCITES (HCC): ICD-10-CM

## 2023-03-15 DIAGNOSIS — K70.31 ALCOHOLIC CIRRHOSIS OF LIVER WITH ASCITES (HCC): Primary | ICD-10-CM

## 2023-03-15 LAB — LABCORP SPECIMEN COLLECTION: NORMAL

## 2023-03-15 PROCEDURE — 76705 ECHO EXAM OF ABDOMEN: CPT

## 2023-03-15 PROCEDURE — 99001 SPECIMEN HANDLING PT-LAB: CPT

## 2023-03-15 PROCEDURE — 99214 OFFICE O/P EST MOD 30 MIN: CPT | Performed by: NURSE PRACTITIONER

## 2023-03-15 ASSESSMENT — PATIENT HEALTH QUESTIONNAIRE - PHQ9
1. LITTLE INTEREST OR PLEASURE IN DOING THINGS: 0
SUM OF ALL RESPONSES TO PHQ QUESTIONS 1-9: 0
SUM OF ALL RESPONSES TO PHQ QUESTIONS 1-9: 0
SUM OF ALL RESPONSES TO PHQ9 QUESTIONS 1 & 2: 0
SUM OF ALL RESPONSES TO PHQ QUESTIONS 1-9: 0
SUM OF ALL RESPONSES TO PHQ QUESTIONS 1-9: 0
2. FEELING DOWN, DEPRESSED OR HOPELESS: 0

## 2023-03-15 NOTE — PROGRESS NOTES
[facility], MD, FACP, [place], [place]      DEE Jackson, AGPCNP-BC   Puja Bethea, [facility]-AG   Miguel Angel Andersen, FNGYPSY-THO Ng, FNP-C   Cindy Marsh, AGPCNP-BC      [address]   at [address], [address]  22.   [phone]   FAX: [address]   at [address], [address] [place] [address], [address]   [phone]   FAX: [phone]       Patient Care Team:  Vira Kraus MD as PCP - General (Internal Medicine Physician)      Problem List  Date Reviewed: 9/15/2022     Patient Active Problem List   Diagnosis    Alcoholic liver disease ([facility]    Ventral hernia without obstruction or gangrene    Cirrhosis ([facility]    Ascites    Alcohol abuse, in remission       Darlyn Fowler is being [facility] at [facility] for management of cirrhosis secondary to alcohol. The active problem list, all pertinent past medical history, medications, liver histology, endoscopic studies, radiologic findings, and laboratory findings related to the liver disorder were reviewed and discussed with the patient. The patient is a 59 y. o.female who was found to have chronic liver disease and cirrhosis in 3/2021 when she underwent a liver ultrasound. Serologic evaluation for markers of chronic liver disease were negative. The most recent imaging of the liver was Ultrasound performed in 3/2023. Results not available at time of this visit. Assessment of liver fibrosis with Fibroscan was performed in the 9/2022. The result was 34.6 kPa which correlates with cirrhosis. The CAP score of 254 suggests there is no significant hepatic steatosis.     The patient has developed the following complications of cirrhosis: ascites    In the office today the patient has the following symptoms: The patient feels well and has no complaints. The patient is not experiencing the following symptoms which are commonly seen in this liver disorder: fatigue, pain in the right side over the liver    The patient completes all daily activities without any functional limitations. ASSESSMENT AND PLAN:  Cirrhosis  The diagnosis of cirrhosis is based upon imaging, laboratory studies, complications of cirrhosis. Cirrhosis is suspected secondary to alcohol based upon a history of consuming significant alcohol on a daily basis for many years, pattern of AST>ALT, serology that is negative for other causes of chronic liver disease. The patient has near normal liver function. The CTP is 6. Child class A. The MELD score is 11. Will perform laboratory testing to monitor liver function and degree of liver injury. This included BMP, hepatic panel, CBC with platelet count, INR. Liver transaminases are normal. ALP is normal. Liver function is normal. Total bilirubin is elevated. The platelet count is depressed. The patient had consumed 3-65 alcoholic beverages daily. The patient has been abstinent from alcohol since 8/2021. Discussed the need to remain alcohol free. Ascites   Ascites has resolved with current dose of diuretics and paracentesis. Paracentesis was last performed in 11/2021. Discussed reducing diuretics to 25 mg spironolactone. If she does not experience recurring ascites she can try to discontinue. The renal function is normal and ascites should be able to be controled with diuretics. The patient was counseled regarding the need to maintain sodium restriction and the types of foods containing high amounts of sodium to be avoided. ECHO performed 10/2021 was normal.    Lower extremity edema  Edema has resolved. Screening for Esophageal varices   The last EGD to assess for varices was performed in 6/2021.     Next EGD to assess for varices is due in 6/2024    Hepatic encephalopathy   Overt HE has not developed to date. There is no reason for treatment with lactulose or xifaxan. There is no need to restrict dietary protein at this time. Thrombocytopenia   This is secondary to cirrhosis. There is no evidence of overt bleeding. This has resolved and platelet count has been normal since 11/2021. Screening for Hepatocellular Carcinoma  HCC screening has recently been performed and does not suggest [facility] The next liver imaging study will be performed in 9/2023  AFP ordered today. Ultrasound will be scheduled. Treatment of other medical problems in patients with chronic liver disease  There are no contraindications for the patient to take most medications that are necessary for treatment of other medical issues. Counseling for alcohol in patients with chronic liver disease  The patient was counseled regarding alcohol consumption and the effect of alcohol on chronic liver disease. The patient has not consumed alcohol since 8/2021    Osteoporosis  The risk of osteoporosis is increased in patients with cirrhosis. DEXA bone density to assess for osteoporosis has not been performed. This should be ordered by the patients primary care physician. Vaccinations   Vaccination for viral hepatitis A and B is not needed. The patient has serologic evidence of prior exposure or vaccination with immunity. Routine vaccinations against other bacterial and viral agents can be performed as indicated. Annual flu vaccination should be administered if indicated. The patient has received 2 doses of COVID-19 vaccine and the booster.      ALLERGIES  No Known Allergies      MEDICATIONS  Current Outpatient Medications   Medication Sig Dispense Refill    ergocalciferol (ERGOCALCIFEROL) 1.25 MG (17323 UT) capsule Take 50,000 Units by mouth every 7 days      folic acid (FOLVITE) 1 MG tablet Take by mouth daily      potassium chloride (KLOR-CON) 10 MEQ extended release tablet Take by mouth      spironolactone (ALDACTONE) 50 MG tablet TAKE 1 TABLET BY MOUTH EVERY DAY       No current facility-administered medications for this visit. SYSTEM REVIEW NOT RELATED TO LIVER DISEASE OR REVIEWED ABOVE:  Constitution systems: Negative for fever, chills, weight gain, weight loss. Eyes: Negative for visual changes. ENT: Negative for sore throat, painful swallowing. Respiratory: Negative for cough, hemoptysis, SOB. Cardiology: Negative for chest pain, palpitations. GI:  Negative for constipation or diarrhea. : Negative for urinary frequency, dysuria, hematuria, nocturia. Skin: Negative for rash. Hematology: Negative for easy bruising, blood clots. Musculo-skelatal: Negative for back pain, muscle pain, weakness. Neurologic: Negative for headaches, dizziness, vertigo, memory problems not related to HE. Psychology: Negative for anxiety, depression. FAMILY HISTORY:  The father  of MI. The mother  of DM. There is no family history of liver disease. SOCIAL HISTORY:  The patient is . The patient has 2 children     The patient has never used tobacco products. The patient previously consumed 5-98 alcoholic beverages per day   The patient has been abstinent from alcohol since 2021   The patient used to work as a . The patient has not worked since 2021. PHYSICAL EXAMINATION:  Visit Vitals  BP (!) 145/69   Pulse (!) 108   Temp 96.8 °F (36 °C)   Ht 5' 1\" (1.549 m)   Wt 116 lb (52.6 kg)   SpO2 98%   BMI 21.92 kg/m²     General: No acute distress. Eyes: Sclera anicteric. ENT: No oral lesions. Thyroid normal.  Nodes: No adenopathy. Skin: No spider angiomata. No jaundice. No palmar erythema. Respiratory: Lungs clear to auscultation. Cardiovascular: Regular heart rate. No murmurs. No JVD. Abdomen: Soft non-tender. Liver size normal to percussion/palpation.   Spleen not palpable. No obvious ascites. Umbilical hernia  Extremities: No edema. No muscle wasting. No gross arthritic changes. Neurologic: Alert and oriented. Cranial nerves grossly intact. No asterixis. LABORATORY STUDIES:  Liver [place] [address] & Units 9/15/2022 3/23/2022   WBC 3.4 - 10.8 x10E3/uL 4.7 6.4   ANC 1.4 - 7.0 x10E3/uL 3.4 4.5   HGB 11.1 - 15.9 g/dL 12.4 10.8 (L)    - 450 x10E3/uL 132 (L) 170   INR 0.9 - 1.2 NA 1.1 1.1   AST 0 - 40 IU/L 18 25   ALT 0 - 32 IU/L 19 14   Alk Phos 44 - 121 IU/L 146 (H) 116   Bili, Total 0.0 - 1.2 mg/dL 0.7 0.4   Bili, Direct 0.00 - 0.40 mg/dL 0.22 0.18   Albumin 3.8 - 4.9 g/dL 4.6 3.8   BUN 6 - 24 mg/dL 2 (L) 10   Creat 0.57 - 1.00 mg/dL 0.76 0.64   Na 134 - 144 mmol/L 134 137   K 3.5 - 5.2 mmol/L 3.9 4.2   Cl 96 - 106 mmol/L 97 102   CO2 20 - 29 mmol/L 20 18 (L)   Glucose 65 - 99 mg/dL 99 82     Cancer Screening Latest Ref Rng & Units 9/15/2022 3/23/2022   AFP, Serum 0.0 - 9.2 ng/mL 6.7 6.2   AFP-L3% 0.0 - 9.9 % Comment Comment     SEROLOGIES:  Serologies Latest Ref Rng & Units 5/5/2021   Hep A Ab, Total Negative Positive (A)   Hep B Surface Ag Negative Negative   Hep B Core Ab, Total Negative Negative   Hep B Surface AB QL  Reactive   Hep C Ab 0.0 - 0.9 s/co ratio <0.1   Ferritin 15 - 150 ng/mL 128   Iron % Saturation 15 - 55 % 31   MALIHA, IFA  Negative   ASMCA 0 - 19 Units 9   Ceruloplasmin 19.0 - 39.0 mg/dL 25.5   Alpha-1 antitrypsin level 101 - 187 mg/dL 198 (H)     LIVER HISTOLOGY:  9/2022. [facility]. EkPa was 34.6. IQR/med 7%. . The results suggested a fibrosis level of F4. The CAP score suggests there is no significant hepatic steatosis. ENDOSCOPIC PROCEDURES:  6/2021. EGD performed by MLS. No esophageal varices. No gastric varices. Mild portal gastropathy. Gastritis. Biopsy for HPylori was negative. RADIOLOGY:  3/2021. Ultrasound of liver. Echogenic consistent with cirrhosis. No liver mass lesions. No dilated bile ducts. Mild ascites. 11/2021. Ultrasound of liver. Echogenic consistent with cirrhosis. No liver mass lesions. No dilated bile ducts. No ascites. 4/2022. Ultrasound of liver. Echogenic consistent with cirrhosis. No liver mass lesions. No dilated bile ducts. No ascites. 8/2022. CT scan abdomen with IV contrast.  Changes consistent with cirrhosis. No liver mass lesions. No dilated bile ducts. No ascites. OTHER TESTING:  Not available or performed    FOLLOW-UP:  All of the issues listed above in the Assessment and Plan were discussed with the patient. All questions were answered. The patient expressed a clear understanding of the above.     Follow-up Fernando Gleason 32 in 6 months for routine monitoring and LARS PetersonBC  [address] [place] [address] [address]  [address]  [phone]

## 2023-03-16 LAB
AFP L3 MFR SERPL: NORMAL % (ref 0–9.9)
AFP SERPL-MCNC: 5.3 NG/ML (ref 0–9.2)
ALBUMIN SERPL-MCNC: 5 G/DL (ref 3.8–4.9)
ALP SERPL-CCNC: 138 IU/L (ref 44–121)
ALT SERPL-CCNC: 25 IU/L (ref 0–32)
AST SERPL-CCNC: 33 IU/L (ref 0–40)
BASOPHILS # BLD AUTO: 0 X10E3/UL (ref 0–0.2)
BASOPHILS NFR BLD AUTO: 1 %
BILIRUB DIRECT SERPL-MCNC: 0.28 MG/DL (ref 0–0.4)
BILIRUB SERPL-MCNC: 1 MG/DL (ref 0–1.2)
BUN SERPL-MCNC: 4 MG/DL (ref 6–24)
BUN/CREAT SERPL: 6 (ref 9–23)
CALCIUM SERPL-MCNC: 10.1 MG/DL (ref 8.7–10.2)
CHLORIDE SERPL-SCNC: 97 MMOL/L (ref 96–106)
CO2 SERPL-SCNC: 23 MMOL/L (ref 20–29)
CREAT SERPL-MCNC: 0.64 MG/DL (ref 0.57–1)
EGFRCR SERPLBLD CKD-EPI 2021: 102 ML/MIN/1.73
EOSINOPHIL # BLD AUTO: 0 X10E3/UL (ref 0–0.4)
EOSINOPHIL NFR BLD AUTO: 1 %
ERYTHROCYTE [DISTWIDTH] IN BLOOD BY AUTOMATED COUNT: 12.6 % (ref 11.7–15.4)
GLUCOSE SERPL-MCNC: 97 MG/DL (ref 70–99)
HCT VFR BLD AUTO: 43.3 % (ref 34–46.6)
HGB BLD-MCNC: 15 G/DL (ref 11.1–15.9)
IMM GRANULOCYTES # BLD AUTO: 0 X10E3/UL (ref 0–0.1)
IMM GRANULOCYTES NFR BLD AUTO: 0 %
LYMPHOCYTES # BLD AUTO: 1.4 X10E3/UL (ref 0.7–3.1)
LYMPHOCYTES NFR BLD AUTO: 23 %
MCH RBC QN AUTO: 32.5 PG (ref 26.6–33)
MCHC RBC AUTO-ENTMCNC: 34.6 G/DL (ref 31.5–35.7)
MCV RBC AUTO: 94 FL (ref 79–97)
MONOCYTES # BLD AUTO: 0.3 X10E3/UL (ref 0.1–0.9)
MONOCYTES NFR BLD AUTO: 6 %
NEUTROPHILS # BLD AUTO: 4.1 X10E3/UL (ref 1.4–7)
NEUTROPHILS NFR BLD AUTO: 69 %
PLATELET # BLD AUTO: 191 X10E3/UL (ref 150–450)
POTASSIUM SERPL-SCNC: 4.1 MMOL/L (ref 3.5–5.2)
PROT SERPL-MCNC: 8.2 G/DL (ref 6–8.5)
RBC # BLD AUTO: 4.61 X10E6/UL (ref 3.77–5.28)
SODIUM SERPL-SCNC: 136 MMOL/L (ref 134–144)
WBC # BLD AUTO: 5.9 X10E3/UL (ref 3.4–10.8)

## 2023-03-20 ENCOUNTER — TELEPHONE (OUTPATIENT)
Age: 58
End: 2023-03-20

## 2023-03-20 NOTE — TELEPHONE ENCOUNTER
----- Message from SHERRELL Mckinley NP sent at 3/16/2023  7:49 AM EDT -----  Labs reviewed.  Liver function is normal. All other labs were either normal and/or abnormal values were not clinically meaningful to patient's current visit

## 2023-09-25 ENCOUNTER — OFFICE VISIT (OUTPATIENT)
Age: 58
End: 2023-09-25
Payer: MEDICAID

## 2023-09-25 ENCOUNTER — HOSPITAL ENCOUNTER (OUTPATIENT)
Facility: HOSPITAL | Age: 58
Setting detail: SPECIMEN
Discharge: HOME | End: 2023-09-28

## 2023-09-25 VITALS
HEIGHT: 61 IN | OXYGEN SATURATION: 99 % | TEMPERATURE: 96.9 F | DIASTOLIC BLOOD PRESSURE: 82 MMHG | SYSTOLIC BLOOD PRESSURE: 146 MMHG | HEART RATE: 71 BPM | BODY MASS INDEX: 21.34 KG/M2 | WEIGHT: 113 LBS

## 2023-09-25 DIAGNOSIS — K70.30 ALCOHOLIC CIRRHOSIS, UNSPECIFIED WHETHER ASCITES PRESENT (HCC): Primary | ICD-10-CM

## 2023-09-25 DIAGNOSIS — K70.30 ALCOHOLIC CIRRHOSIS, UNSPECIFIED WHETHER ASCITES PRESENT (HCC): ICD-10-CM

## 2023-09-25 LAB — LABCORP SPECIMEN COLLECTION: NORMAL

## 2023-09-25 PROCEDURE — 91200 LIVER ELASTOGRAPHY: CPT | Performed by: NURSE PRACTITIONER

## 2023-09-25 PROCEDURE — 99214 OFFICE O/P EST MOD 30 MIN: CPT | Performed by: NURSE PRACTITIONER

## 2023-09-25 NOTE — PROGRESS NOTES
MD Misa, FACP, [place], [place]      DEE Law, [facility]-BC   Silver Bui, [facility]   Rajani Dela Cruz, KIZZY Storey P-THO Cobb, [facility]-BC      [address] [address], [place]   at [facility]   [facility], [address] [facility], [facility]   [phone]   FAX: [phone]   at [facility], [address] [place] [address], [address]   [phone]   FAX: [phone]     Patient Care Team:  Qian Torrez MD as PCP - General (Internal Medicine Physician)    Problem List  Date Reviewed: 9/15/2022     Patient Active Problem List   Diagnosis    Alcoholic liver disease ([address])    Ventral hernia without obstruction or gangrene    Cirrhosis ([address])    Ascites    Alcohol abuse, in remission       Cornelio Harden [facility] for management of cirrhosis secondary to alcohol. The active problem list, all pertinent past medical history, medications, liver histology, endoscopic studies, radiologic findings, and laboratory findings related to the liver disorder were reviewed and discussed with the patient. The patient is a 59 y. o.female who was found to have chronic liver disease and cirrhosis in 3/2021 when she underwent a liver ultrasound. Serologic evaluation for markers of chronic liver disease were negative. The most recent imaging of the liver was Ultrasound performed in 3/2023. Results not available at time of this visit. Assessment of liver fibrosis with Fibroscan was performed in the office today. The result was 22.3 kPa which correlates with cirrhosis. The CAP score of 209  does not suggest hepatic steatosis.     The patient has developed the following complications of cirrhosis: ascites    The patient does not have any

## 2023-09-26 LAB
AFP-TM SERPL-MCNC: 4.8 NG/ML (ref 0–9.2)
ALBUMIN SERPL-MCNC: 4.5 G/DL (ref 3.8–4.9)
ALP SERPL-CCNC: 95 IU/L (ref 44–121)
ALT SERPL-CCNC: 19 IU/L (ref 0–32)
AST SERPL-CCNC: 23 IU/L (ref 0–40)
BASOPHILS # BLD AUTO: 0 X10E3/UL (ref 0–0.2)
BASOPHILS NFR BLD AUTO: 1 %
BILIRUB DIRECT SERPL-MCNC: 0.2 MG/DL (ref 0–0.4)
BILIRUB SERPL-MCNC: 0.6 MG/DL (ref 0–1.2)
BUN SERPL-MCNC: 4 MG/DL (ref 6–24)
BUN/CREAT SERPL: 6 (ref 9–23)
CALCIUM SERPL-MCNC: 9.3 MG/DL (ref 8.7–10.2)
CHLORIDE SERPL-SCNC: 100 MMOL/L (ref 96–106)
CO2 SERPL-SCNC: 22 MMOL/L (ref 20–29)
CREAT SERPL-MCNC: 0.63 MG/DL (ref 0.57–1)
EGFRCR SERPLBLD CKD-EPI 2021: 102 ML/MIN/1.73
EOSINOPHIL # BLD AUTO: 0.1 X10E3/UL (ref 0–0.4)
EOSINOPHIL NFR BLD AUTO: 1 %
ERYTHROCYTE [DISTWIDTH] IN BLOOD BY AUTOMATED COUNT: 11.8 % (ref 11.7–15.4)
GLUCOSE SERPL-MCNC: 98 MG/DL (ref 70–99)
HCT VFR BLD AUTO: 37.3 % (ref 34–46.6)
HGB BLD-MCNC: 13 G/DL (ref 11.1–15.9)
IMM GRANULOCYTES # BLD AUTO: 0 X10E3/UL (ref 0–0.1)
IMM GRANULOCYTES NFR BLD AUTO: 0 %
LYMPHOCYTES # BLD AUTO: 1.2 X10E3/UL (ref 0.7–3.1)
LYMPHOCYTES NFR BLD AUTO: 21 %
MCH RBC QN AUTO: 32.3 PG (ref 26.6–33)
MCHC RBC AUTO-ENTMCNC: 34.9 G/DL (ref 31.5–35.7)
MCV RBC AUTO: 93 FL (ref 79–97)
MONOCYTES # BLD AUTO: 0.3 X10E3/UL (ref 0.1–0.9)
MONOCYTES NFR BLD AUTO: 5 %
NEUTROPHILS # BLD AUTO: 4.1 X10E3/UL (ref 1.4–7)
NEUTROPHILS NFR BLD AUTO: 72 %
PLATELET # BLD AUTO: 159 X10E3/UL (ref 150–450)
POTASSIUM SERPL-SCNC: 3.9 MMOL/L (ref 3.5–5.2)
PROT SERPL-MCNC: 6.9 G/DL (ref 6–8.5)
RBC # BLD AUTO: 4.02 X10E6/UL (ref 3.77–5.28)
SODIUM SERPL-SCNC: 138 MMOL/L (ref 134–144)
WBC # BLD AUTO: 5.6 X10E3/UL (ref 3.4–10.8)

## 2024-03-25 ENCOUNTER — OFFICE VISIT (OUTPATIENT)
Age: 59
End: 2024-03-25
Payer: MEDICAID

## 2024-03-25 VITALS
SYSTOLIC BLOOD PRESSURE: 118 MMHG | HEIGHT: 61 IN | BODY MASS INDEX: 19.63 KG/M2 | RESPIRATION RATE: 16 BRPM | HEART RATE: 74 BPM | OXYGEN SATURATION: 96 % | WEIGHT: 104 LBS | DIASTOLIC BLOOD PRESSURE: 70 MMHG

## 2024-03-25 DIAGNOSIS — K74.60 HEPATIC CIRRHOSIS, UNSPECIFIED HEPATIC CIRRHOSIS TYPE, UNSPECIFIED WHETHER ASCITES PRESENT (HCC): Primary | ICD-10-CM

## 2024-03-25 PROCEDURE — 99214 OFFICE O/P EST MOD 30 MIN: CPT | Performed by: NURSE PRACTITIONER

## 2024-03-25 RX ORDER — FLUOXETINE HYDROCHLORIDE 20 MG/1
20 CAPSULE ORAL DAILY
COMMUNITY

## 2024-03-25 NOTE — PROGRESS NOTES
ascites.    8/2022. CT scan abdomen with IV contrast.  Changes consistent with cirrhosis. No liver mass lesions.  No dilated bile ducts.  No ascites.    3/2023. Ultrasound of liver. Echogenic consistent with cirrhosis. No liver mass lesions. No dilated bile ducts. No ascites.    OTHER TESTING:  Not available or performed    FOLLOW-UP:  All of the issues listed above in the Assessment and Plan were discussed with the patient.  All questions were answered.  The patient expressed a clear understanding of the above.    Follow-up Liver [facility] in 6 months for routine monitoring and Fibroscan      JOSE Amaral  [facility]  [address] [place] [address], [address]  [place]  [address] [phone]

## 2024-04-10 ENCOUNTER — CLINICAL DOCUMENTATION (OUTPATIENT)
Age: 59
End: 2024-04-10

## 2024-04-10 NOTE — PROGRESS NOTES
Patient left a voicemail on 4/10/24 asking if she will sedated for procedure on next week returned patients call and relayed procedure instructions patient understood

## 2024-04-15 ENCOUNTER — ANESTHESIA (OUTPATIENT)
Facility: HOSPITAL | Age: 59
End: 2024-04-15
Payer: COMMERCIAL

## 2024-04-15 ENCOUNTER — ANESTHESIA EVENT (OUTPATIENT)
Facility: HOSPITAL | Age: 59
End: 2024-04-15
Payer: COMMERCIAL

## 2024-04-15 ENCOUNTER — HOSPITAL ENCOUNTER (OUTPATIENT)
Facility: HOSPITAL | Age: 59
Setting detail: OUTPATIENT SURGERY
Discharge: HOME | End: 2024-04-15
Attending: INTERNAL MEDICINE | Admitting: INTERNAL MEDICINE
Payer: COMMERCIAL

## 2024-04-15 VITALS
HEIGHT: 61 IN | DIASTOLIC BLOOD PRESSURE: 77 MMHG | RESPIRATION RATE: 16 BRPM | OXYGEN SATURATION: 99 % | WEIGHT: 104 LBS | BODY MASS INDEX: 19.63 KG/M2 | SYSTOLIC BLOOD PRESSURE: 106 MMHG | TEMPERATURE: 97.5 F | HEART RATE: 86 BPM

## 2024-04-15 PROCEDURE — 3600007502: Performed by: INTERNAL MEDICINE

## 2024-04-15 PROCEDURE — 6360000002 HC RX W HCPCS: Performed by: NURSE ANESTHETIST, CERTIFIED REGISTERED

## 2024-04-15 PROCEDURE — 3700000001 HC ADD 15 MINUTES (ANESTHESIA): Performed by: INTERNAL MEDICINE

## 2024-04-15 PROCEDURE — 2500000003 HC RX 250 WO HCPCS: Performed by: NURSE ANESTHETIST, CERTIFIED REGISTERED

## 2024-04-15 PROCEDURE — 43235 EGD DIAGNOSTIC BRUSH WASH: CPT | Performed by: INTERNAL MEDICINE

## 2024-04-15 PROCEDURE — 2580000003 HC RX 258: Performed by: INTERNAL MEDICINE

## 2024-04-15 PROCEDURE — 3600007512: Performed by: INTERNAL MEDICINE

## 2024-04-15 PROCEDURE — 2709999900 HC NON-CHARGEABLE SUPPLY: Performed by: INTERNAL MEDICINE

## 2024-04-15 PROCEDURE — 2580000003 HC RX 258: Performed by: NURSE ANESTHETIST, CERTIFIED REGISTERED

## 2024-04-15 PROCEDURE — 3700000000 HC ANESTHESIA ATTENDED CARE: Performed by: INTERNAL MEDICINE

## 2024-04-15 PROCEDURE — 7100000010 HC PHASE II RECOVERY - FIRST 15 MIN: Performed by: INTERNAL MEDICINE

## 2024-04-15 RX ORDER — GLYCOPYRROLATE 0.2 MG/ML
INJECTION INTRAMUSCULAR; INTRAVENOUS PRN
Status: DISCONTINUED | OUTPATIENT
Start: 2024-04-15 | End: 2024-04-15 | Stop reason: SDUPTHER

## 2024-04-15 RX ORDER — FENTANYL CITRATE 50 UG/ML
25 INJECTION, SOLUTION INTRAMUSCULAR; INTRAVENOUS AS NEEDED
Status: DISCONTINUED | OUTPATIENT
Start: 2024-04-15 | End: 2024-04-15 | Stop reason: HOSPADM

## 2024-04-15 RX ORDER — SODIUM CHLORIDE 9 MG/ML
INJECTION, SOLUTION INTRAVENOUS PRN
Status: DISCONTINUED | OUTPATIENT
Start: 2024-04-15 | End: 2024-04-15 | Stop reason: HOSPADM

## 2024-04-15 RX ORDER — SODIUM CHLORIDE 0.9 % (FLUSH) 0.9 %
5-40 SYRINGE (ML) INJECTION PRN
Status: CANCELLED | OUTPATIENT
Start: 2024-04-15

## 2024-04-15 RX ORDER — ONDANSETRON 2 MG/ML
2 INJECTION INTRAMUSCULAR; INTRAVENOUS AS NEEDED
Status: DISCONTINUED | OUTPATIENT
Start: 2024-04-15 | End: 2024-04-15 | Stop reason: HOSPADM

## 2024-04-15 RX ORDER — LIDOCAINE HYDROCHLORIDE 10 MG/ML
INJECTION, SOLUTION INFILTRATION; PERINEURAL PRN
Status: DISCONTINUED | OUTPATIENT
Start: 2024-04-15 | End: 2024-04-15 | Stop reason: SDUPTHER

## 2024-04-15 RX ORDER — SODIUM CHLORIDE 0.9 % (FLUSH) 0.9 %
5-40 SYRINGE (ML) INJECTION EVERY 12 HOURS SCHEDULED
Status: CANCELLED | OUTPATIENT
Start: 2024-04-15

## 2024-04-15 RX ORDER — PROPOFOL 10 MG/ML
INJECTION, EMULSION INTRAVENOUS PRN
Status: DISCONTINUED | OUTPATIENT
Start: 2024-04-15 | End: 2024-04-15 | Stop reason: SDUPTHER

## 2024-04-15 RX ORDER — ACETAMINOPHEN 500 MG
1000 TABLET ORAL EVERY 6 HOURS PRN
COMMUNITY

## 2024-04-15 RX ORDER — SODIUM CHLORIDE 9 MG/ML
INJECTION, SOLUTION INTRAVENOUS CONTINUOUS
Status: DISCONTINUED | OUTPATIENT
Start: 2024-04-15 | End: 2024-04-15 | Stop reason: HOSPADM

## 2024-04-15 RX ORDER — SODIUM CHLORIDE 9 MG/ML
INJECTION, SOLUTION INTRAVENOUS CONTINUOUS PRN
Status: DISCONTINUED | OUTPATIENT
Start: 2024-04-15 | End: 2024-04-15 | Stop reason: SDUPTHER

## 2024-04-15 RX ADMIN — SODIUM CHLORIDE: 9 INJECTION, SOLUTION INTRAVENOUS at 10:35

## 2024-04-15 RX ADMIN — GLYCOPYRROLATE 0.2 MG: 0.2 INJECTION, SOLUTION INTRAMUSCULAR; INTRAVENOUS at 10:38

## 2024-04-15 RX ADMIN — SODIUM CHLORIDE: 9 INJECTION, SOLUTION INTRAVENOUS at 10:17

## 2024-04-15 RX ADMIN — PROPOFOL 100 MG: 10 INJECTION, EMULSION INTRAVENOUS at 10:39

## 2024-04-15 RX ADMIN — LIDOCAINE HYDROCHLORIDE 5 ML: 10 INJECTION, SOLUTION INFILTRATION; PERINEURAL at 10:38

## 2024-04-15 ASSESSMENT — PAIN SCALES - GENERAL: PAINLEVEL_OUTOF10: 0

## 2024-04-15 ASSESSMENT — PAIN - FUNCTIONAL ASSESSMENT: PAIN_FUNCTIONAL_ASSESSMENT: 0-10

## 2024-04-15 ASSESSMENT — LIFESTYLE VARIABLES: SMOKING_STATUS: 0

## 2024-04-15 NOTE — ANESTHESIA POSTPROCEDURE EVALUATION
Department of Anesthesiology  Postprocedure Note    Patient: Betty Jimenez  MRN: [id number]  YOB: 1965  Date of evaluation: 4/15/2024    Procedure Summary       Date: 04/15/24 Room / Location: [facility] 01 / [facility] ENDOSCOPY    Anesthesia Start: 1035 Anesthesia Stop: 1047    Procedure: ESOPHAGOGASTRODUODENOSCOPY (Upper GI Region) Diagnosis:       Hepatic cirrhosis, unspecified hepatic cirrhosis type, unspecified whether ascites present (HCC)      (Hepatic cirrhosis, unspecified hepatic cirrhosis type, unspecified whether ascites present (HCC) [K74.60])    Surgeons: Jeramy Soria MD Responsible Provider: Junior Guillory MD    Anesthesia Type: General ASA Status: 3            Anesthesia Type: General    [place] Phase I: [place] Score: 10    [place] Phase II: [place] Score: 10    Anesthesia Post Evaluation    Patient location during evaluation: PACU  Patient participation: complete - patient participated  Level of consciousness: awake and alert  Pain score: 0  Airway patency: patent  Nausea & Vomiting: no nausea and no vomiting  Cardiovascular status: blood pressure returned to baseline  Respiratory status: acceptable  Hydration status: euvolemic    No notable events documented.

## 2024-04-15 NOTE — PERIOP NOTE
Attempted to call patient to confirm arrival time, no answer and VM left.  
Dr Soria at bedside talking to pt and her friend, all questions answred  
PAT Activity Status Questionnaire     Yes No Points for YES    Are you able to climb a flight of stairs or walk up a hill? [x] [] 1   Are you able to do heavy work around the house like lifting or moving heavy furniture? [x] [] 1   Do yardwork like raking leaves, weeding or pushing a power mower? [x] [] 1   Participate in strenuous sports like swimming, singles tennis, football, basketball or skiing? [x] [] 1   Total Score: 4         Reviewed history, meds,allergies.Pt to call provider for any further questions about prep or DOS procedure. Pt instructed to bring ID and insurance on day of procedure. Pt instructed to register in the Endoscopy Suite on day of procedure.    
Reviewed PTA medication list with patient/caregiver and patient/caregiver denies any additional medications. Patient admits to having a responsible adult care for them for at least 24 hours after surgery.       
TRANSFER - IN REPORT:    Verbal report received from Kylee evangelista on Betty Jimenez  being received from PROCEDURE ROOM for routine progression of patient care      Report consisted of patient's Situation, Background, Assessment and   Recommendations(SBAR).     Information from the following report(s) Nurse Handoff Report, Intake/Output, MAR, Recent Results, and Med Rec Status was reviewed with the receiving nurse.    Opportunity for questions and clarification was provided.      Assessment completed upon patient's arrival to unit and care assumed.     
Breath sounds clear and equal bilaterally.

## 2024-04-15 NOTE — ANESTHESIA PRE PROCEDURE
denies   • ORTHOPEDIC SURGERY      back surgery   • OTHER SURGICAL HISTORY  1988    bunion removal right   • UMBILICAL HERNIA REPAIR  2021       Social History:    Social History     Tobacco Use   • Smoking status: Never   • Smokeless tobacco: Current   • Tobacco comments:     Quit smoking: Pt chews tobacco   Substance Use Topics   • Alcohol use: Not Currently     Comment: 2021                                Ready to quit: Not Answered  Counseling given: Not Answered  Tobacco comments: Quit smoking: Pt chews tobacco      Vital Signs (Current):   Vitals:    04/15/24 0930   BP: 123/74   Pulse: 70   Resp: 15   Temp: 97.8 °F (36.6 °C)   TempSrc: Axillary   SpO2: 100%   Weight: 47.2 kg (104 lb)   Height: 1.549 m (5' 1\")                                              BP Readings from Last 3 Encounters:   04/15/24 123/74   03/25/24 118/70   09/25/23 (!) 146/82       NPO Status:                          Time of last solid consumption: 1900                        Date of last liquid consumption: 04/14/24                        Date of last solid food consumption: 04/14/24    BMI:   Wt Readings from Last 3 Encounters:   04/15/24 47.2 kg (104 lb)   03/25/24 47.2 kg (104 lb)   09/25/23 51.3 kg (113 lb)     Body mass index is 19.65 kg/m².    CBC:   Lab Results   Component Value Date/Time    WBC 5.6 09/25/2023 12:00 AM    RBC 4.02 09/25/2023 12:00 AM    HGB 13.0 09/25/2023 12:00 AM    HCT 37.3 09/25/2023 12:00 AM    MCV 93 09/25/2023 12:00 AM    RDW 11.8 09/25/2023 12:00 AM     09/25/2023 12:00 AM       CMP:   Lab Results   Component Value Date/Time     09/25/2023 12:00 AM    K 3.9 09/25/2023 12:00 AM     09/25/2023 12:00 AM    CO2 22 09/25/2023 12:00 AM    BUN 4 09/25/2023 12:00 AM    CREATININE 0.63 09/25/2023 12:00 AM    GFRAA 110 01/19/2022 12:00 AM    AGRATIO 0.5 08/16/2021 11:50 AM    LABGLOM 102 09/25/2023 12:00 AM    GLUCOSE 98 09/25/2023 12:00 AM    PROT 6.9 09/25/2023 12:00 AM    CALCIUM 9.3 09/25/2023

## 2024-04-15 NOTE — DISCHARGE INSTRUCTIONS
office at the phone number listed above to inquire about the results.    ENDOSCOPY FINDINGS:  Your endoscopy was normal  Will repeat EGD to look for development of varices in 3 years.  Keep follow-up appointment with Dixie on 10/10/2024.    DISCHARGE SUMMARY from the Nurse:  The following personal items collected during your admission are returned to you:      Vision - Corrective Lenses: Eyeglasses (with pt)  Hearing Aid: None  Jewelry: None  Clothing: Shirt, Footwear, Jacket/Coat (in the  bag)     Valuables Given To: Family (Comment)           .  DISCHARGE SUMMARY from Nurse    PATIENT INSTRUCTIONS:    After general anesthesia or intravenous sedation, for 24 hours or while taking prescription Narcotics:  Limit your activities  Do not drive and operate hazardous machinery  Do not make important personal or business decisions  Do  not drink alcoholic beverages  If you have not urinated within 8 hours after discharge, please contact your surgeon on call.    Report the following to your surgeon:  Excessive pain, swelling, redness or odor of or around the surgical area  Temperature over 100.5  Nausea and vomiting lasting longer than 4 hours or if unable to take medications  Any signs of decreased circulation or nerve impairment to extremity: change in color, persistent  numbness, tingling, coldness or increase pain  Any questions    What to do at Home:  Recommended activity: activity as tolerated and no driving for today.    If you experience any of the following symptoms FEVER, CHILLS, BLEEDING, CHEST PIAN, SHORTNESS OF BREATH, UNCONTROLLED PAIN, NAUSEA, VOMITING, please follow up with Dr Soria.    *  Please give a list of your current medications to your Primary Care Provider.    *  Please update this list whenever your medications are discontinued, doses are      changed, or new medications (including over-the-counter products) are added.    *  Please carry medication information at all times in case of emergency

## 2024-04-15 NOTE — OP NOTE
[facility]      Jeramy Soria MD, FACP, FACG, FAASLD      [facility], PALEORA Spicer, [facility]   Kylie Mcdanielcalderonkendell, [facility]   Sarah Andrew, [facility]  Sandor Marrero, [facility]   Dixie Medrano, [facility]   Siomara Restrepo, [facility]   [address], [address]   [place]  [address] [phone]   FAX: [phone]  [facility]   [address], [address]   [place]  [address] [phone]   FAX: [phone]         UPPER ENDOSCOPY PROCEDURE NOTE    NAME: Betty Jimenez  :  1965  MRN:  [id number]    INDICATION: Cirrhosis.  Screening for esophageal varices with variceal ligation if  indicated.    : Jeramy Soria MD    SURGICAL ASSISTANT:  None    PROSTHETIC DEVISES, TISSUE GRAFTS, ORGAN TRANSPLANTS:  Not applicable     ANESTHESIA/SEDATION: MAC Sedation per anesthesiology         PROCEDURE DESCRIPTION:  [employer] consent was obtained from the patient for the procedure.  All risks and benefits of the procedure explained.     The procedure was performed in the endoscopy suite.  The patient was laying on a stretcher and moved to the left lateral decubitus position prior to administration of sedation.    Sedation was administered by anesthesiology.  See their note for details.      The endoscope was inserted into the mouth and advanced under direct vision to the second portion of the duodenum.  Careful inspection of upper gastrointestinal tract was made as the endoscope was inserted and withdrawn.  Retroflexion of the endoscope to view of the cardia of the stomach was performed.  The findings and interventions are described below.      FINDINGS:  Esophagus:    Normal.      Stomach:   Normal    Duodenum:   Normal bulb and second portion    SPECIMENS COLLECTED:

## 2024-04-15 NOTE — H&P
[facility]      Jeramy Soria MD, FACP, FACG, FAASLD      DEE Calvo, [facility]   Kylie Mcdanielcalderonkendell, [facility]   Sarah Andrew, [facility]  Sandor Marrero, [facility]   Dixie Medrano, [facility]   Siomara Restrepo, [facility]   [address], [address]   [place]  [address] [phone]   FAX: [phone]  [facility]   [address], [address]   [place]  [address] [phone]   FAX: [phone]         PRE-PROCEDURE NOTE - EGD    H and P from last office visit reviewed.    Allergies reviewed.  Out-patient medicaton list reviewed.    Patient Active Problem List   Diagnosis    Alcoholic liver disease (HCC)    Ventral hernia without obstruction or gangrene    Cirrhosis (HCC)    Ascites    Alcohol abuse, in remission         No Known Allergies    No current facility-administered medications on file prior to encounter.     Current Outpatient Medications on File Prior to Encounter   Medication Sig Dispense Refill    acetaminophen (TYLENOL) 500 MG tablet Take 2 tablets by mouth every 6 hours as needed for Pain      FLUoxetine (PROZAC) 20 MG capsule Take 1 capsule by mouth daily      ergocalciferol (ERGOCALCIFEROL) 1.25 MG (10117 UT) capsule Take 1 capsule by mouth every 7 days         For EGD to assess for esophageal and gastric varices.  Plan to perform banding if indicated based upon variceal size and appearance.    PHYSICAL EXAMINATION:  Vital signs per nursing and anesthesia records      General: No acute distress.   Eyes: Sclera anicteric.   ENT: No oral lesions.  Thyroid normal.  Nodes: No adenopathy.   Skin: No spider angiomata.  No jaundice.  No palmar erythema.  Respiratory: Lungs clear to auscultation.   Cardiovascular: Regular heart rate.  No murmurs.  No JVD.  Abdomen:

## 2024-10-09 RX ORDER — CETIRIZINE HYDROCHLORIDE 10 MG/1
10 TABLET ORAL
COMMUNITY
Start: 2024-08-22 | End: 2024-10-10

## 2024-10-09 RX ORDER — FAMOTIDINE 20 MG/1
20 TABLET, FILM COATED ORAL 2 TIMES DAILY
COMMUNITY
Start: 2024-08-22

## 2024-10-09 RX ORDER — FLUTICASONE PROPIONATE 50 MCG
2 SPRAY, SUSPENSION (ML) NASAL DAILY
COMMUNITY
Start: 2024-08-22 | End: 2024-10-10

## 2024-10-09 RX ORDER — DEXTROMETHORPHAN HBR AND GUAIFENESIN 5; 100 MG/5ML; MG/5ML
5 LIQUID ORAL 3 TIMES DAILY PRN
COMMUNITY
Start: 2024-08-22 | End: 2024-10-10

## 2024-10-09 RX ORDER — AMOXICILLIN 250 MG
2 CAPSULE ORAL DAILY
COMMUNITY
Start: 2024-03-12 | End: 2024-10-10

## 2024-10-10 ENCOUNTER — OFFICE VISIT (OUTPATIENT)
Age: 59
End: 2024-10-10

## 2024-10-10 ENCOUNTER — CLINICAL DOCUMENTATION (OUTPATIENT)
Age: 59
End: 2024-10-10

## 2024-10-10 VITALS
DIASTOLIC BLOOD PRESSURE: 67 MMHG | BODY MASS INDEX: 19.45 KG/M2 | WEIGHT: 103 LBS | HEART RATE: 71 BPM | OXYGEN SATURATION: 96 % | TEMPERATURE: 97.4 F | HEIGHT: 61 IN | SYSTOLIC BLOOD PRESSURE: 110 MMHG

## 2024-10-10 DIAGNOSIS — K70.30 ALCOHOLIC CIRRHOSIS, UNSPECIFIED WHETHER ASCITES PRESENT (HCC): Primary | ICD-10-CM

## 2024-10-10 NOTE — PROGRESS NOTES
No ascites.    OTHER TESTING:  Not available or performed    FOLLOW-UP:  All of the issues listed above in the Assessment and Plan were discussed with the patient.  All questions were answered.  The patient expressed a clear understanding of the above.    Follow-up Liver [facility] in 6 months for routine monitoring.       Dixie Medrano AGPCNP-BC  [address], [address]  [place]  [address] [phone]

## 2025-04-10 ENCOUNTER — OFFICE VISIT (OUTPATIENT)
Age: 60
End: 2025-04-10
Payer: COMMERCIAL

## 2025-04-10 VITALS
TEMPERATURE: 97.7 F | DIASTOLIC BLOOD PRESSURE: 78 MMHG | SYSTOLIC BLOOD PRESSURE: 121 MMHG | WEIGHT: 103 LBS | OXYGEN SATURATION: 98 % | HEIGHT: 61 IN | HEART RATE: 77 BPM | BODY MASS INDEX: 19.45 KG/M2

## 2025-04-10 DIAGNOSIS — K74.60 HEPATIC CIRRHOSIS, UNSPECIFIED HEPATIC CIRRHOSIS TYPE, UNSPECIFIED WHETHER ASCITES PRESENT (HCC): ICD-10-CM

## 2025-04-10 DIAGNOSIS — K74.60 HEPATIC CIRRHOSIS, UNSPECIFIED HEPATIC CIRRHOSIS TYPE, UNSPECIFIED WHETHER ASCITES PRESENT (HCC): Primary | ICD-10-CM

## 2025-04-10 PROCEDURE — 99214 OFFICE O/P EST MOD 30 MIN: CPT | Performed by: NURSE PRACTITIONER

## 2025-04-10 RX ORDER — NYSTATIN 100000 [USP'U]/ML
SUSPENSION ORAL
COMMUNITY
Start: 2025-03-12 | End: 2025-04-10 | Stop reason: CLARIF

## 2025-04-10 RX ORDER — PREDNISONE 20 MG/1
TABLET ORAL
COMMUNITY
Start: 2025-01-07 | End: 2025-04-10 | Stop reason: CLARIF

## 2025-04-10 RX ORDER — LIDOCAINE HYDROCHLORIDE 20 MG/ML
SOLUTION OROPHARYNGEAL
COMMUNITY
Start: 2025-03-12 | End: 2025-04-10 | Stop reason: CLARIF

## 2025-04-10 RX ORDER — AMOXICILLIN 875 MG/1
TABLET, COATED ORAL
COMMUNITY
Start: 2025-03-12 | End: 2025-04-10 | Stop reason: CLARIF

## 2025-04-10 NOTE — PROGRESS NOTES
Ceruloplasmin 19.0 - 39.0 mg/dL 25.5   Alpha-1 antitrypsin level 101 - 187 mg/dL 198 (H)     LIVER HISTOLOGY:  9/2022. FibroScan performed at [facility]. EkPa was 34.6. IQR/med 7%.  . The results suggested a fibrosis level of F4. The CAP score suggests there is no significant hepatic steatosis.    9/2023. FibroScan performed at [facility]. EkPa was 22.3. IQR/med 9%. . The results suggested a fibrosis level of F4. The CAP score suggests there is no significant hepatic steatosis.    10/2024. FibroScan performed at [facility]. EkPa was 17.8.  IQR/med 7%.  .   The results suggested a fibrosis level of F4. The CAP score suggests there is no significant hepatic steatosis.    ENDOSCOPIC PROCEDURES:  6/2021. EGD performed by [facility]. No esophageal varices. No gastric varices. Mild portal gastropathy.  Gastritis. Biopsy for HPylori was negative.      4/2024. EGD performed by [facility]. No esophageal varices. No gastric varices. No portal gastropathy.      RADIOLOGY:  3/2023. Ultrasound of liver. Echogenic consistent with cirrhosis. No liver mass lesions. No dilated bile ducts. No ascites.    10/2023. Ultrasound of liver. Echogenic consistent with cirrhosis. No liver mass lesions. No dilated bile ducts. No ascites.    10/2024. Ultrasound of liver. Echogenic consistent with cirrhosis. No liver mass lesions. No dilated bile ducts. No ascites.    OTHER TESTING:  Not available or performed    FOLLOW-UP:  All of the issues listed above in the Assessment and Plan were discussed with the patient.  All questions were answered.  The patient expressed a clear understanding of the above.    Follow-up [facility] in 6 months for routine monitoring.       JOSE Amaral  [facility]  [address], [address]  [place]  [address] [phone]

## 2025-04-18 LAB
ALBUMIN SERPL-MCNC: 4.3 G/DL (ref 3.9–4.9)
ALP SERPL-CCNC: 145 IU/L (ref 44–121)
ALT SERPL-CCNC: 20 IU/L (ref 0–32)
AST SERPL-CCNC: 29 IU/L (ref 0–40)
BASOPHILS # BLD AUTO: 0 X10E3/UL (ref 0–0.2)
BASOPHILS NFR BLD AUTO: 1 %
BILIRUB DIRECT SERPL-MCNC: 0.16 MG/DL (ref 0–0.4)
BILIRUB SERPL-MCNC: 0.5 MG/DL (ref 0–1.2)
BUN SERPL-MCNC: 7 MG/DL (ref 8–27)
BUN/CREAT SERPL: 9 (ref 12–28)
CALCIUM SERPL-MCNC: 9.5 MG/DL (ref 8.7–10.3)
CHLORIDE SERPL-SCNC: 100 MMOL/L (ref 96–106)
CO2 SERPL-SCNC: 24 MMOL/L (ref 20–29)
CREAT SERPL-MCNC: 0.76 MG/DL (ref 0.57–1)
EGFRCR SERPLBLD CKD-EPI 2021: 89 ML/MIN/1.73
EOSINOPHIL # BLD AUTO: 0.2 X10E3/UL (ref 0–0.4)
EOSINOPHIL NFR BLD AUTO: 5 %
ERYTHROCYTE [DISTWIDTH] IN BLOOD BY AUTOMATED COUNT: 12.7 % (ref 11.7–15.4)
GLUCOSE SERPL-MCNC: 102 MG/DL (ref 70–99)
HCT VFR BLD AUTO: 40.6 % (ref 34–46.6)
HGB BLD-MCNC: 13.5 G/DL (ref 11.1–15.9)
IMM GRANULOCYTES # BLD AUTO: 0 X10E3/UL (ref 0–0.1)
IMM GRANULOCYTES NFR BLD AUTO: 0 %
LYMPHOCYTES # BLD AUTO: 1.1 X10E3/UL (ref 0.7–3.1)
LYMPHOCYTES NFR BLD AUTO: 24 %
MCH RBC QN AUTO: 29.7 PG (ref 26.6–33)
MCHC RBC AUTO-ENTMCNC: 33.3 G/DL (ref 31.5–35.7)
MCV RBC AUTO: 89 FL (ref 79–97)
MONOCYTES # BLD AUTO: 0.3 X10E3/UL (ref 0.1–0.9)
MONOCYTES NFR BLD AUTO: 7 %
NEUTROPHILS # BLD AUTO: 2.8 X10E3/UL (ref 1.4–7)
NEUTROPHILS NFR BLD AUTO: 63 %
PLATELET # BLD AUTO: 238 X10E3/UL (ref 150–450)
POTASSIUM SERPL-SCNC: 4.3 MMOL/L (ref 3.5–5.2)
PROT SERPL-MCNC: 7.3 G/DL (ref 6–8.5)
RBC # BLD AUTO: 4.54 X10E6/UL (ref 3.77–5.28)
SODIUM SERPL-SCNC: 138 MMOL/L (ref 134–144)
WBC # BLD AUTO: 4.5 X10E3/UL (ref 3.4–10.8)

## 2025-04-29 ENCOUNTER — RESULTS FOLLOW-UP (OUTPATIENT)
Age: 60
End: 2025-04-29

## 2025-05-05 LAB
AFP L3 MFR SERPL: NORMAL % (ref 0–9.9)
AFP SERPL-MCNC: 3.4 NG/ML (ref 0–9.2)

## (undated) DEVICE — ENDO CARRY-ON PROCEDURE KIT INCLUDES ENZYMATIC SPONGE, GAUZE, BIOHAZARD LABEL, TRAY, LUBRICANT, DIRTY SCOPE LABEL, WATER LABEL, TRAY, DRAWSTRING PAD, AND DEFENDO 4-PIECE KIT.: Brand: ENDO CARRY-ON PROCEDURE KIT

## (undated) DEVICE — MOUTHPIECE ENDOSCP L CTRL OPN AND SIDE PORTS DISP

## (undated) DEVICE — SPONGE GZ W4XL4IN COT 12 PLY TYP VII WVN C FLD DSGN

## (undated) DEVICE — MOUTHPIECE ENDOSCP 20X27MM --

## (undated) DEVICE — SINGLE PORT MANIFOLD: Brand: NEPTUNE 2

## (undated) DEVICE — TUBING SUCT L12FT DIA0.25IN CLR W/ MAXI-GRIP AND M/M CONN

## (undated) DEVICE — KENDALL RADIOLUCENT FOAM MONITORING ELECTRODE RECTANGULAR SHAPE: Brand: KENDALL

## (undated) DEVICE — MEDI-VAC NON-CONDUCTIVE SUCTION TUBING: Brand: CARDINAL HEALTH

## (undated) DEVICE — MAJ-1414 SINGLE USE ADPATER BIOPSY VALV: Brand: SINGLE USE ADAPTOR BIOPSY VALVE

## (undated) DEVICE — FORCEPS BX CAP 240CM L RAD JAW 4

## (undated) DEVICE — TRAP SPEC COLL POLYP POLYSTYR --